# Patient Record
Sex: MALE | Race: WHITE | URBAN - METROPOLITAN AREA
[De-identification: names, ages, dates, MRNs, and addresses within clinical notes are randomized per-mention and may not be internally consistent; named-entity substitution may affect disease eponyms.]

---

## 2017-03-26 ENCOUNTER — COMMUNICATION - HEALTHEAST (OUTPATIENT)
Dept: FAMILY MEDICINE | Facility: CLINIC | Age: 77
End: 2017-03-26

## 2017-05-22 ENCOUNTER — RECORDS - HEALTHEAST (OUTPATIENT)
Dept: ADMINISTRATIVE | Facility: OTHER | Age: 77
End: 2017-05-22

## 2017-11-10 ENCOUNTER — AMBULATORY - HEALTHEAST (OUTPATIENT)
Dept: CARDIOLOGY | Facility: CLINIC | Age: 77
End: 2017-11-10

## 2017-11-10 ENCOUNTER — RECORDS - HEALTHEAST (OUTPATIENT)
Dept: ADMINISTRATIVE | Facility: OTHER | Age: 77
End: 2017-11-10

## 2018-01-15 ENCOUNTER — AMBULATORY - HEALTHEAST (OUTPATIENT)
Dept: CARDIOLOGY | Facility: CLINIC | Age: 78
End: 2018-01-15

## 2018-01-15 ENCOUNTER — HOME CARE/HOSPICE - HEALTHEAST (OUTPATIENT)
Dept: HOME HEALTH SERVICES | Facility: HOME HEALTH | Age: 78
End: 2018-01-15

## 2018-01-15 ENCOUNTER — RECORDS - HEALTHEAST (OUTPATIENT)
Dept: ADMINISTRATIVE | Facility: OTHER | Age: 78
End: 2018-01-15

## 2018-01-18 ENCOUNTER — AMBULATORY - HEALTHEAST (OUTPATIENT)
Dept: CARDIOLOGY | Facility: CLINIC | Age: 78
End: 2018-01-18

## 2018-01-18 ENCOUNTER — HOME CARE/HOSPICE - HEALTHEAST (OUTPATIENT)
Dept: HOME HEALTH SERVICES | Facility: HOME HEALTH | Age: 78
End: 2018-01-18

## 2018-01-18 DIAGNOSIS — Z95.0 PACEMAKER: ICD-10-CM

## 2018-01-18 DIAGNOSIS — I48.20 CHRONIC ATRIAL FIBRILLATION (H): ICD-10-CM

## 2018-01-18 ASSESSMENT — MIFFLIN-ST. JEOR: SCORE: 1388.46

## 2018-01-19 ENCOUNTER — RECORDS - HEALTHEAST (OUTPATIENT)
Dept: LAB | Facility: CLINIC | Age: 78
End: 2018-01-19

## 2018-01-19 ENCOUNTER — RECORDS - HEALTHEAST (OUTPATIENT)
Dept: ADMINISTRATIVE | Facility: OTHER | Age: 78
End: 2018-01-19

## 2018-01-19 LAB
ALBUMIN SERPL-MCNC: 2.3 G/DL (ref 3.5–5)
ALP SERPL-CCNC: 96 U/L (ref 45–120)
ALT SERPL W P-5'-P-CCNC: 32 U/L (ref 0–45)
ANION GAP SERPL CALCULATED.3IONS-SCNC: 10 MMOL/L (ref 5–18)
AST SERPL W P-5'-P-CCNC: 32 U/L (ref 0–40)
BILIRUB SERPL-MCNC: 0.4 MG/DL (ref 0–1)
BUN SERPL-MCNC: 47 MG/DL (ref 8–28)
CALCIUM SERPL-MCNC: 7.9 MG/DL (ref 8.5–10.5)
CHLORIDE BLD-SCNC: 98 MMOL/L (ref 98–107)
CO2 SERPL-SCNC: 27 MMOL/L (ref 22–31)
CREAT SERPL-MCNC: 4.66 MG/DL (ref 0.7–1.3)
GFR SERPL CREATININE-BSD FRML MDRD: 12 ML/MIN/1.73M2
GLUCOSE BLD-MCNC: 123 MG/DL (ref 70–125)
MAGNESIUM SERPL-MCNC: 2.3 MG/DL (ref 1.8–2.6)
POTASSIUM BLD-SCNC: 4.6 MMOL/L (ref 3.5–5)
PROT SERPL-MCNC: 5.7 G/DL (ref 6–8)
SODIUM SERPL-SCNC: 135 MMOL/L (ref 136–145)

## 2018-01-20 LAB — HCC DEVICE COMMENTS: NORMAL

## 2018-01-23 ENCOUNTER — HOME CARE/HOSPICE - HEALTHEAST (OUTPATIENT)
Dept: HOME HEALTH SERVICES | Facility: HOME HEALTH | Age: 78
End: 2018-01-23

## 2018-01-27 ENCOUNTER — COMMUNICATION - HEALTHEAST (OUTPATIENT)
Dept: SCHEDULING | Facility: CLINIC | Age: 78
End: 2018-01-27

## 2018-01-28 ENCOUNTER — HOME CARE/HOSPICE - HEALTHEAST (OUTPATIENT)
Dept: HOME HEALTH SERVICES | Facility: HOME HEALTH | Age: 78
End: 2018-01-28

## 2018-01-30 ENCOUNTER — HOME CARE/HOSPICE - HEALTHEAST (OUTPATIENT)
Dept: HOME HEALTH SERVICES | Facility: HOME HEALTH | Age: 78
End: 2018-01-30

## 2018-01-30 ENCOUNTER — RECORDS - HEALTHEAST (OUTPATIENT)
Dept: ADMINISTRATIVE | Facility: OTHER | Age: 78
End: 2018-01-30

## 2018-01-30 ENCOUNTER — RECORDS - HEALTHEAST (OUTPATIENT)
Dept: LAB | Facility: CLINIC | Age: 78
End: 2018-01-30

## 2018-01-31 ENCOUNTER — HOME CARE/HOSPICE - HEALTHEAST (OUTPATIENT)
Dept: HOME HEALTH SERVICES | Facility: HOME HEALTH | Age: 78
End: 2018-01-31

## 2018-01-31 LAB
ERYTHROCYTE [DISTWIDTH] IN BLOOD BY AUTOMATED COUNT: 15.3 % (ref 11–14.5)
HCT VFR BLD AUTO: 32.2 % (ref 40–54)
HGB BLD-MCNC: 10.5 G/DL (ref 14–18)
MAGNESIUM SERPL-MCNC: 1.9 MG/DL (ref 1.8–2.6)
MCH RBC QN AUTO: 32.2 PG (ref 27–34)
MCHC RBC AUTO-ENTMCNC: 32.6 G/DL (ref 32–36)
MCV RBC AUTO: 99 FL (ref 80–100)
PLATELET # BLD AUTO: 201 THOU/UL (ref 140–440)
PMV BLD AUTO: 10.9 FL (ref 8.5–12.5)
RBC # BLD AUTO: 3.26 MILL/UL (ref 4.4–6.2)
WBC: 7.3 THOU/UL (ref 4–11)

## 2018-02-01 ENCOUNTER — HOME CARE/HOSPICE - HEALTHEAST (OUTPATIENT)
Dept: HOME HEALTH SERVICES | Facility: HOME HEALTH | Age: 78
End: 2018-02-01

## 2018-02-02 ENCOUNTER — HOME CARE/HOSPICE - HEALTHEAST (OUTPATIENT)
Dept: HOME HEALTH SERVICES | Facility: HOME HEALTH | Age: 78
End: 2018-02-02

## 2018-02-03 ENCOUNTER — HOME CARE/HOSPICE - HEALTHEAST (OUTPATIENT)
Dept: HOME HEALTH SERVICES | Facility: HOME HEALTH | Age: 78
End: 2018-02-03

## 2018-02-05 ENCOUNTER — HOME CARE/HOSPICE - HEALTHEAST (OUTPATIENT)
Dept: HOME HEALTH SERVICES | Facility: HOME HEALTH | Age: 78
End: 2018-02-05

## 2018-02-06 ENCOUNTER — HOME CARE/HOSPICE - HEALTHEAST (OUTPATIENT)
Dept: HOME HEALTH SERVICES | Facility: HOME HEALTH | Age: 78
End: 2018-02-06

## 2018-02-07 ENCOUNTER — HOME CARE/HOSPICE - HEALTHEAST (OUTPATIENT)
Dept: HOME HEALTH SERVICES | Facility: HOME HEALTH | Age: 78
End: 2018-02-07

## 2018-02-08 ENCOUNTER — HOME CARE/HOSPICE - HEALTHEAST (OUTPATIENT)
Dept: HOME HEALTH SERVICES | Facility: HOME HEALTH | Age: 78
End: 2018-02-08

## 2018-02-09 ENCOUNTER — HOME CARE/HOSPICE - HEALTHEAST (OUTPATIENT)
Dept: HOME HEALTH SERVICES | Facility: HOME HEALTH | Age: 78
End: 2018-02-09

## 2018-02-12 ENCOUNTER — HOME CARE/HOSPICE - HEALTHEAST (OUTPATIENT)
Dept: HOME HEALTH SERVICES | Facility: HOME HEALTH | Age: 78
End: 2018-02-12

## 2018-02-13 ENCOUNTER — HOME CARE/HOSPICE - HEALTHEAST (OUTPATIENT)
Dept: HOME HEALTH SERVICES | Facility: HOME HEALTH | Age: 78
End: 2018-02-13

## 2018-02-14 ENCOUNTER — HOME CARE/HOSPICE - HEALTHEAST (OUTPATIENT)
Dept: HOME HEALTH SERVICES | Facility: HOME HEALTH | Age: 78
End: 2018-02-14

## 2018-02-19 ENCOUNTER — HOME CARE/HOSPICE - HEALTHEAST (OUTPATIENT)
Dept: HOME HEALTH SERVICES | Facility: HOME HEALTH | Age: 78
End: 2018-02-19

## 2018-03-01 ENCOUNTER — RECORDS - HEALTHEAST (OUTPATIENT)
Dept: ADMINISTRATIVE | Facility: OTHER | Age: 78
End: 2018-03-01

## 2018-04-12 ENCOUNTER — AMBULATORY - HEALTHEAST (OUTPATIENT)
Dept: CARDIOLOGY | Facility: CLINIC | Age: 78
End: 2018-04-12

## 2018-04-12 DIAGNOSIS — Z95.0 CARDIAC PACEMAKER IN SITU: ICD-10-CM

## 2018-04-12 LAB — HCC DEVICE COMMENTS: NORMAL

## 2018-04-13 ENCOUNTER — RECORDS - HEALTHEAST (OUTPATIENT)
Dept: ADMINISTRATIVE | Facility: OTHER | Age: 78
End: 2018-04-13

## 2018-04-24 ENCOUNTER — RECORDS - HEALTHEAST (OUTPATIENT)
Dept: ADMINISTRATIVE | Facility: OTHER | Age: 78
End: 2018-04-24

## 2018-05-11 ENCOUNTER — RECORDS - HEALTHEAST (OUTPATIENT)
Dept: ADMINISTRATIVE | Facility: OTHER | Age: 78
End: 2018-05-11

## 2018-06-15 ENCOUNTER — RECORDS - HEALTHEAST (OUTPATIENT)
Dept: ADMINISTRATIVE | Facility: OTHER | Age: 78
End: 2018-06-15

## 2018-07-16 ENCOUNTER — AMBULATORY - HEALTHEAST (OUTPATIENT)
Dept: CARDIOLOGY | Facility: CLINIC | Age: 78
End: 2018-07-16

## 2018-07-16 DIAGNOSIS — Z95.0 CARDIAC PACEMAKER IN SITU: ICD-10-CM

## 2018-07-16 LAB
HCC DEVICE COMMENTS: NORMAL
HCC DEVICE IMPLANTING PROVIDER: NORMAL
HCC DEVICE MANUFACTURE: NORMAL
HCC DEVICE MODEL: NORMAL
HCC DEVICE SERIAL NUMBER: NORMAL
HCC DEVICE TYPE: NORMAL

## 2018-07-27 ENCOUNTER — RECORDS - HEALTHEAST (OUTPATIENT)
Dept: ADMINISTRATIVE | Facility: OTHER | Age: 78
End: 2018-07-27

## 2018-08-03 ENCOUNTER — OFFICE VISIT (OUTPATIENT)
Dept: NEUROLOGY | Facility: CLINIC | Age: 78
End: 2018-08-03
Payer: COMMERCIAL

## 2018-08-03 VITALS
OXYGEN SATURATION: 100 % | HEART RATE: 66 BPM | DIASTOLIC BLOOD PRESSURE: 60 MMHG | SYSTOLIC BLOOD PRESSURE: 114 MMHG | WEIGHT: 137 LBS

## 2018-08-03 DIAGNOSIS — G93.40 ENCEPHALOPATHY: Primary | ICD-10-CM

## 2018-08-03 DIAGNOSIS — F68.8 PERSONALITY CHANGE: ICD-10-CM

## 2018-08-03 DIAGNOSIS — R63.4 LOSS OF WEIGHT: ICD-10-CM

## 2018-08-03 RX ORDER — WARFARIN SODIUM 2 MG/1
TABLET ORAL
COMMUNITY
Start: 2018-01-27

## 2018-08-03 RX ORDER — AMLODIPINE BESYLATE 5 MG/1
TABLET ORAL
Refills: 3 | COMMUNITY
Start: 2018-05-30

## 2018-08-03 RX ORDER — IPRATROPIUM BROMIDE AND ALBUTEROL SULFATE 2.5; .5 MG/3ML; MG/3ML
3 SOLUTION RESPIRATORY (INHALATION)
COMMUNITY
Start: 2018-01-27 | End: 2018-09-05

## 2018-08-03 ASSESSMENT — PAIN SCALES - GENERAL: PAINLEVEL: NO PAIN (0)

## 2018-08-03 NOTE — NURSING NOTE
Chief Complaint   Patient presents with     RECHECK     NEW SX. DUE TO ALZHEIMERS/DEMENTIA     Flora Salazar

## 2018-08-03 NOTE — LETTER
8/3/2018       RE: Erick Ortiz  [address] [place] [address]     Dear Colleague,    Thank you for referring your patient, Erick Ortiz, to the [facility] NEUROLOGY at [facility]. Please see a copy of my visit note below.    Service Date: 2018      Clint Jacobsen MD   [facility]    [address] [place] [address]       RE: Erick Ortiz   MRN: [id number]   : 1940      Dear Dr. Jacobsen:      Thank you for referring Erick Ortiz for neurologic consultation on 2018.  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      The patient is known to me from consultation a number of years ago.  I saw him for postherpetic neuralgia.  He has not seen me now for some time.  He did have a recollection of seeing me in the past and I do have his records available to me.  He now comes according to his wife with personality change over the last 1-1/2 to 2 years.  He has developed a pseudobulbar state.  He tends to cry when he is happy over the last 6 months.  He is extremely talkative.  He does not seem to note some personal boundaries in terms of speaking with people and he has at times become irritable.  He probably has suffered from memory loss for some time.  He has not suffered any head injury or significant concussions.  There is nothing to suggest seizures.  He has not had stroke-like symptoms.  There is no new focal weakness, paresthesias or dysequilibrium.  He has not been incontinent of stool and is on dialysis.  He has had some occasional myoclonus, according to his wife, but that actually has improved in the recent years.  He has had this for at least 6 years associated with his renal failure.  He does not seem to have any unusual dreams or actual hallucinations.  He does suffer from chronic insomnia.        CURRENT  MEDIATIONS:   The patient is taking number of medications includin.  Amitriptyline 25 mg at bedtime.     2.  Catapres.     3.  Lasix.   4.  Gabapentin 100 mg t.i.d.   5.  Lisinopril.     6.  Metoprolol.     7.  Pantoprazole.     8.  Pravastatin.     9.  Warfarin.     10.  Potassium chloride.     11.  Aspirin.     12.  Vitamin D.   13.  p.r.n. nitroglycerin.     14.  Zantac.   15.  p.r.n. hydroxyzine.        The patient has had past history of coronary artery disease and has had a coronary stent placement.  He has had a pacemaker placed.  The patient does suffer from chronic hypertension.  He has chronic obstructive lung disease but continues to smoke and knows it is injurious to his health.  He is in chronic atrial fibrillation.  The patient does receive peroneal dialysis.  He has had a prior history of prostate cancer treated by cryotherapy 5 years ago with no recurrence.  I saw him for postherpetic neuralgia and weakness in the past but that basically has resolved involving the right leg.  He does continue to drink 1 Manhattan a day.  The patient has no family history to suggest significant memory problems.  The patient had been taking gabapentin in the past, but now is actually off the drug, although it is listed on his medication list.  He has not been on it according to his wife for at least 9-10 years.  He may also be off Catapres.  The patient has had a history of prior mechanical back pain.  I last saw him in 2013.      ALLERGIES:  He has allergies listed to fentanyl, penicillin and midazolam.      The patient has had some issues with short-term memory.  He reviewed his family history and his father  of myocardial infarction at 44 and his mother basically of old age at 88.  The patient's mother  at 88 of old age, but also had dementia.  The patient's mother also had had a respiratory arrest.        The patient has lost weight now and 10 years ago weighed 180 pounds and now weighs 137 pounds.   He has suffered from orthostatic hypotension in the past but that seems to have improved.      Neurologic examination revealed a pleasant, talkative man.  He knew it was early August and knew the day of the week, but did not know the exact date.  He knew it was 2018.  He knew he was at the [facility].  He could recall 2/3 objects after 5 minutes.  He was able to draw a clock correctly to state 4:30.  The patient could tell me the states that touch [place].  He could tell me some events concerning the [employer] presidency.  Otherwise, gait, station, cerebellar testing except for difficulty with tandem with some swaying on Romberg, muscle stretch reflexes which were absent, plantar stimulation with bilateral withdrawal to plantar stimulation, strength testing except residual weakness of the right anterior tibialis muscle and toe extensors, cranial nerve examination, superficial and cortical sensory testing are unremarkable except for decreased proprioception involving the right toe muscles.  The patient did have also decreased sensation in the dorsum of his right foot to pinprick and light touch.  He had a very mild tremor.  He does have a scleral hemorrhage.  He did not have any palmomental reflexes or other frontal lobe release signs.  I could not auscultate cervical bruits.  The patient had a regular cardiac rhythm without gallops or murmurs.      I was able to review with him some limited records from [facility].  The patient on 01/21/2018 had a CT scan in the hospital after he was evaluated for delirium.  He was in for some non-neurologic problem and his wife noted he had had delirium in the past for hospitalizations.  He had diffuse cerebral atrophy noted.        IMPRESSION:  Personality change associated with pseudobulbar state as well as irritability and probable mild memory loss.      The patient does appear to be different than from the last time I saw him 5 years ago.  He has had  evaluations in the past for delirium.  He could not be completely sure, nor could his wife about all of his medications and I have asked that they bring them with on his next visit.  He is going to need appropriate blood work done.  Much of the blood work has been done through his nephrologist, Dr. Flor.  I have asked that he have an EEG done to help rule out nonconvulsive seizures. OT eval will be done for safety.  He will have appropriate blood work depending on review of blood tests.  I did talk about the possibility of further evaluation and treatment based on review of these new tests.      Thank you for allowing me to see this patient.       Sincerely yours,       Harlan Calvert MD      I spent 50 minutes with the patient and his wife today.  Over 50% of the time this involved counseling and coordination of care.  A complete review of medical systems was done and a positive review is listed in the report above.      D: 2018   T: 2018   MT: AKA      Name:     KEISHA DUGAN   MRN:      [id number]        Account:      [id number]   :      1940           Service Date: 2018      Document: [id number]

## 2018-08-03 NOTE — MR AVS SNAPSHOT
After Visit Summary   8/3/2018    Erick Ortiz    MRN: [id number]           Patient Information     Date Of Birth          1940        Visit Information        Provider Department      8/3/2018 3:30 PM Harlan Calvert MD [facility] Neurology        Today's Diagnoses     Encephalopathy    -  1    Personality change        Loss of weight           Follow-ups after your visit        Your next 10 appointments already scheduled     Aug 22, 2018 12:00 PM CDT   (Arrive by 11:45 AM)   In Lab Video Visit with  EEG TECH 2   EEG [facility] OUT[facility] ([facility])    [address] [place] [id number] [phone]            Aug 31, 2018  3:30 PM CDT   (Arrive by 3:15 PM)   Return Visit with Harlan Calvert MD   [facility] Neurology ([facility])    [address] [place] [address] [phone]              Future tests that were ordered for you today     Open Future Orders        Priority Expected Expires Ordered    T4 free Routine 8/3/2018 8/3/2019 8/3/2018    T3 total Routine 8/3/2018 8/3/2019 8/3/2018    TSH Routine 8/3/2018 8/3/2019 8/3/2018    Vitamin B12 Routine 8/3/2018 8/3/2019 8/3/2018    Methylmalonic acid Routine 8/3/2018 8/3/2019 8/3/2018    Ammonia Routine  8/3/2019 8/3/2018    Hepatic panel Routine 8/3/2018 8/3/2019 8/3/2018    EEG Routine  8/3/2019 8/3/2018            Who to contact     Please call your clinic at [phone] to:    Ask questions about your health    Make or cancel appointments    Discuss your medicines    Learn about your test results    Speak to your doctor            Additional Information About Your Visit        Bflyhart Information     Framed Data is an electronic gateway that provides easy, online access to your medical records. With Framed Data, you can request a clinic appointment, read your test results, renew a prescription or communicate with your care team.     To  sign up for [employer] visit the website at [email or web address]   You will be asked to enter the access code listed below, as well as some personal information. Please follow the directions to create your username and password.     Your access code is: [email or web address]  Expires: 10/29/2018  6:30 AM     Your access code will  in 90 days. If you need help or a new code, please contact your [facility] or call [phone] for assistance.        Care EveryWhere ID     This is your Care EveryWhere ID. This could be used by other organizations to access your [facility] medical records  [id number]        Your Vitals Were     Pulse Pulse Oximetry                66 100%           Blood Pressure from Last 3 Encounters:   18 114/60   13 138/78    Weight from Last 3 Encounters:   18 62.1 kg (137 lb)              We Performed the Following     OCCUPATIONAL THERAPY EVALUATION        Primary Care Provider Office Phone # Fax #    Sandro Mcdaniel [phone]       [facility] [address] [address]        Equal Access to Services     [facility] CenterJAME AH: Hadii aad ku hadasho Soomaali, waaxda luqadaha, qaybta kaalmada adeegyada, waxay mingin haygaben val garcia . So [facility] [phone].    ATENCIÓN: Si habla español, tiene a simon disposición servicios gratuitos de asistencia lingüística. Llame al [phone].    We comply with applicable federal civil rights laws and [place] laws. We do not discriminate on the basis of race, color, national origin, age, disability, sex, sexual orientation, or gender identity.            Thank you!     Thank you for choosing [facility] NEUROLOGY  for your care. Our goal is always to provide you with excellent care. Hearing back from our patients is one way we can continue to improve our services. Please take a few minutes to complete the written survey that you may receive in the mail after your  visit with us. Thank you!             Your Updated Medication List - Protect others around you: Learn how to safely use, store and throw away your medicines at www.disposemymeds.org.          This list is accurate as of 8/3/18  5:05 PM.  Always use your most recent med list.                   Brand Name Dispense Instructions for use Diagnosis    amitriptyline 25 MG tablet    ELAVIL     Take 1 tablet by mouth At Bedtime        amLODIPine 5 MG tablet    NORVASC      Encephalopathy, Personality change, Loss of weight       aspirin 81 MG tablet      Take 1 tablet by mouth daily        CALCIUM + D PO      Take 1 tablet by mouth daily        CENTRUM SILVER ADULT 50+ PO      Take 1 tablet by mouth daily        * cloNIDine 0.1 MG tablet    CATAPRES     Take 0.1 mg by mouth 2 times daily        * cloNIDine 0.2 MG tablet    CATAPRES     Take 1 tablet by mouth 2 times daily        Co Q 10 100 MG Caps      Take 1 capsule by mouth daily        * gabapentin 100 MG capsule    NEURONTIN     Take 2 capsules by mouth 2 times daily        * gabapentin 100 MG capsule    NEURONTIN    360 capsule    Use 200mg bid    Herpes zoster, Idiopathic progressive polyneuropathy       ipratropium - albuterol 0.5 mg/2.5 mg/3 mL 0.5-2.5 (3) MG/3ML neb solution    DUONEB     3 mLs    Encephalopathy, Personality change, Loss of weight       IRON PO      Take 1 tablet by mouth    Encephalopathy, Personality change, Loss of weight       LASIX 80 MG tablet   Generic drug:  furosemide      Take 1 tablet by mouth daily        lisinopril 20 MG tablet    PRINIVIL/ZESTRIL     2 tab PO every morning, 1 tab PO every evening        metoprolol succinate 50 MG 24 hr tablet    TOPROL-XL     Take 1 tablet by mouth daily        nitroGLYcerin 0.4 MG sublingual tablet    NITROSTAT     Place 1 tablet under the tongue every 5 minutes as needed        potassium chloride SA 20 MEQ CR tablet    K-DUR/KLOR-CON M     Take 1 tablet by mouth 2 times daily        pravastatin 80  MG tablet    PRAVACHOL     Take 1 tablet by mouth daily        ranitidine 150 MG tablet    ZANTAC     Take 1 tablet by mouth as needed        vitamin D3 37518 UNITS capsule    CHOLECALCIFEROL     Take 1 capsule by mouth every 7 days        * WARFARIN SODIUM PO      Take as directed per INR clinic        * warfarin 2 MG tablet    COUMADIN     Take 4 mg on 1/28, redose on 1/29 based on INR per primary MDAdjust dose based on INR results as directed.    Encephalopathy, Personality change, Loss of weight       * Notice:  This list has 6 medication(s) that are the same as other medications prescribed for you. Read the directions carefully, and ask your doctor or other care provider to review them with you.

## 2018-08-06 DIAGNOSIS — R63.4 LOSS OF WEIGHT: ICD-10-CM

## 2018-08-06 DIAGNOSIS — G93.40 ENCEPHALOPATHY: ICD-10-CM

## 2018-08-06 DIAGNOSIS — F68.8 PERSONALITY CHANGE: ICD-10-CM

## 2018-08-06 LAB
ALBUMIN SERPL-MCNC: 2.9 G/DL (ref 3.4–5)
ALP SERPL-CCNC: 77 U/L (ref 40–150)
ALT SERPL W P-5'-P-CCNC: 19 U/L (ref 0–70)
AMMONIA PLAS-SCNC: 20 UMOL/L (ref 10–50)
AST SERPL W P-5'-P-CCNC: 16 U/L (ref 0–45)
BILIRUB DIRECT SERPL-MCNC: 0.1 MG/DL (ref 0–0.2)
BILIRUB SERPL-MCNC: 0.3 MG/DL (ref 0.2–1.3)
PROT SERPL-MCNC: 6.2 G/DL (ref 6.8–8.8)
T3 SERPL-MCNC: 56 NG/DL (ref 60–181)
T4 FREE SERPL-MCNC: 0.69 NG/DL (ref 0.76–1.46)
TSH SERPL DL<=0.005 MIU/L-ACNC: 1.06 MU/L (ref 0.4–4)
VIT B12 SERPL-MCNC: 540 PG/ML (ref 193–986)

## 2018-08-09 LAB — METHYLMALONATE SERPL-SCNC: 0.98 UMOL/L (ref 0–0.4)

## 2018-08-21 NOTE — PROGRESS NOTES
Service Date: 2018      Clint Jacobsen MD   [facility]    [address] [place] [address]       RE: Erick Ortiz   MRN: [id number]   : 1940      Dear Dr. Jacobsen:      Thank you for referring Erick Ortiz for neurologic consultation on 2018.  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      The patient is known to me from consultation a number of years ago.  I saw him for postherpetic neuralgia.  He has not seen me now for some time.  He did have a recollection of seeing me in the past and I do have his records available to me.  He now comes according to his wife with personality change over the last 1-1/2 to 2 years.  He has developed a pseudobulbar state.  He tends to cry when he is happy over the last 6 months.  He is extremely talkative.  He does not seem to note some personal boundaries in terms of speaking with people and he has at times become irritable.  He probably has suffered from memory loss for some time.  He has not suffered any head injury or significant concussions.  There is nothing to suggest seizures.  He has not had stroke-like symptoms.  There is no new focal weakness, paresthesias or dysequilibrium.  He has not been incontinent of stool and is on dialysis.  He has had some occasional myoclonus, according to his wife, but that actually has improved in the recent years.  He has had this for at least 6 years associated with his renal failure.  He does not seem to have any unusual dreams or actual hallucinations.  He does suffer from chronic insomnia.        CURRENT MEDIATIONS:   The patient is taking number of medications includin.  Amitriptyline 25 mg at bedtime.     2.  Catapres.     3.  Lasix.   4.  Gabapentin 100 mg t.i.d.   5.  Lisinopril.     6.  Metoprolol.     7.  Pantoprazole.     8.  Pravastatin.     9.  Warfarin.      10.  Potassium chloride.     11.  Aspirin.     12.  Vitamin D.   13.  p.r.n. nitroglycerin.     14.  Zantac.   15.  p.r.n. hydroxyzine.        The patient has had past history of coronary artery disease and has had a coronary stent placement.  He has had a pacemaker placed.  The patient does suffer from chronic hypertension.  He has chronic obstructive lung disease but continues to smoke and knows it is injurious to his health.  He is in chronic atrial fibrillation.  The patient does receive peroneal dialysis.  He has had a prior history of prostate cancer treated by cryotherapy 5 years ago with no recurrence.  I saw him for postherpetic neuralgia and weakness in the past but that basically has resolved involving the right leg.  He does continue to drink 1 Manhattan a day.  The patient has no family history to suggest significant memory problems.  The patient had been taking gabapentin in the past, but now is actually off the drug, although it is listed on his medication list.  He has not been on it according to his wife for at least 9-10 years.  He may also be off Catapres.  The patient has had a history of prior mechanical back pain.  I last saw him in 2013.      ALLERGIES:  He has allergies listed to fentanyl, penicillin and midazolam.      The patient has had some issues with short-term memory.  He reviewed his family history and his father  of myocardial infarction at 44 and his mother basically of old age at 88.  The patient's mother  at 88 of old age, but also had dementia.  The patient's mother also had had a respiratory arrest.        The patient has lost weight now and 10 years ago weighed 180 pounds and now weighs 137 pounds.  He has suffered from orthostatic hypotension in the past but that seems to have improved.      Neurologic examination revealed a pleasant, talkative man.  He knew it was early August and knew the day of the week, but did not know the exact date.  He knew it was .   He knew he was at the [facility].  He could recall 2/3 objects after 5 minutes.  He was able to draw a clock correctly to state 4:30.  The patient could tell me the states that touch [place].  He could tell me some events concerning the [employer] presidency.  Otherwise, gait, station, cerebellar testing except for difficulty with tandem with some swaying on Romberg, muscle stretch reflexes which were absent, plantar stimulation with bilateral withdrawal to plantar stimulation, strength testing except residual weakness of the right anterior tibialis muscle and toe extensors, cranial nerve examination, superficial and cortical sensory testing are unremarkable except for decreased proprioception involving the right toe muscles.  The patient did have also decreased sensation in the dorsum of his right foot to pinprick and light touch.  He had a very mild tremor.  He does have a scleral hemorrhage.  He did not have any palmomental reflexes or other frontal lobe release signs.  I could not auscultate cervical bruits.  The patient had a regular cardiac rhythm without gallops or murmurs.      I was able to review with him some limited records from [facility].  The patient on 01/21/2018 had a CT scan in the hospital after he was evaluated for delirium.  He was in for some non-neurologic problem and his wife noted he had had delirium in the past for hospitalizations.  He had diffuse cerebral atrophy noted.        IMPRESSION:  Personality change associated with pseudobulbar state as well as irritability and probable mild memory loss.      The patient does appear to be different than from the last time I saw him 5 years ago.  He has had evaluations in the past for delirium.  He could not be completely sure, nor could his wife about all of his medications and I have asked that they bring them with on his next visit.  He is going to need appropriate blood work done.  Much of the blood work has been done  through his nephrologist, Dr. Flor.  I have asked that he have an EEG done to help rule out nonconvulsive seizures. OT eval will be done for safety.  He will have appropriate blood work depending on review of blood tests.  I did talk about the possibility of further evaluation and treatment based on review of these new tests.      Thank you for allowing me to see this patient.       Sincerely yours,       Argenis Calvert MD      I spent 50 minutes with the patient and his wife today.  Over 50% of the time this involved counseling and coordination of care.  A complete review of medical systems was done and a positive review is listed in the report above.         ARGENIS CALVERT MD             D: 2018   T: 2018   MT: AKA      Name:     KEISHA DUGAN   MRN:      [id number]        Account:      [id number]   :      1940           Service Date: 2018      Document: [id number]

## 2018-08-22 ENCOUNTER — OFFICE VISIT (OUTPATIENT)
Dept: NEUROLOGY | Facility: CLINIC | Age: 78
End: 2018-08-22
Payer: COMMERCIAL

## 2018-08-22 ENCOUNTER — TELEPHONE (OUTPATIENT)
Dept: NEUROLOGY | Facility: CLINIC | Age: 78
End: 2018-08-22

## 2018-08-22 DIAGNOSIS — R63.4 LOSS OF WEIGHT: ICD-10-CM

## 2018-08-22 DIAGNOSIS — R89.9 ABNORMAL LABORATORY TEST: ICD-10-CM

## 2018-08-22 DIAGNOSIS — G93.40 ENCEPHALOPATHY: Primary | ICD-10-CM

## 2018-08-22 DIAGNOSIS — F68.8 PERSONALITY CHANGE: ICD-10-CM

## 2018-08-22 DIAGNOSIS — G93.40 ENCEPHALOPATHY: ICD-10-CM

## 2018-08-22 NOTE — LETTER
2018      Erick Ortiz  [address] [place] [address]        Dear ,    We are writing to inform you of your test results.      No epilepsy-related abnormalities were recorded on this EEG.        Resulted Orders   EEG    Transcription      [facility] EEG #  (Out-Patient Video-EEG Monitoring)    Name:     Erick Ortiz   MRN: [id number]   : 1940   Procedure Date: 2018   Duration of Recordin hours, 51 minutes.      CLINICAL SUMMARY:  This diagnostic video-EEG monitoring procedure is performed in evaluation of encephalopathy in Erick Ortiz.  He was reported to be receiving gabapentin at the time of this recording.      TECHNICAL SUMMARY:  This continuous EEG monitoring procedure was performed with 23 scalp electrodes in 10-20 system placements, and additional scalp, precordial and other surface electrodes used for electrical referencing and artifact detection.  A single channel of EKG was recorded for purposes of analyzing EKG artifacts in the EEG channels.  Video monitoring was utilized and periodically reviewed by EEG technologist and the physician for electroclinical correlation.    INTERICTAL EEG ACTIVITIES:  During waking there was a 9 Hz posterior dominant rhythm.  There was frequent occurrence of intermixed generalized 4-8 Hz theta slowing symmetrically in waking.    Drowsiness was manifested by predominance of centrally maximum semirhythmic theta slowing and dropout of the posterior dominant rhythm during deeper drowsiness.  There was symmetric bilateral driving in response to photic stimulation.  Hyperventilation was not performed.   No interictal epileptiform abnormalities were recorded.    ICTAL RECORDINGS:  No electrographic seizures and no paroxysmal behavioral events occurred during this procedure.      SUMMARY OF VIDEO-EEG MONITORING:         The interictal EEG recording in waking and drowsiness was abnormal due to generalized theta slowing during waking, with  occasional brief runs of left frontotemporal delta slowing in waking and drowsiness.  No interictal epileptiform abnormalities, no electrographic seizures, and no paroxysmal behavioral events were recorded during the period of monitoring.         These findings indicate mild electrographic encephalopathy, with additional left frontotemporal neuronal dysfunction, which are etiologically nonspecific findings.  Clinical correlation is recommended.   Joo Worley M.D., Professor of Neurology        D: 2018   T: 2018   MT: CHRISTINA      Name:     KEISHA DUGAN   MRN:      [id number]        Account:        [id number]   :      1940           Procedure Date: 2018      Document: [id number]                 If you have any questions or concerns, please call the clinic at the number listed above.       Sincerely,        EEG Tech

## 2018-08-22 NOTE — MR AVS SNAPSHOT
After Visit Summary   8/22/2018    Erick Ortiz    MRN: [id number]           Patient Information     Date Of Birth          1940        Visit Information        Provider Department      8/22/2018 12:00 PM [facility] EEG TECH 2 EEG [facility] OUTPATIENT        Today's Diagnoses     Encephalopathy        Personality change        Loss of weight           Follow-ups after your visit        Your next 10 appointments already scheduled     Aug 31, 2018  3:30 PM CDT   (Arrive by 3:15 PM)   Return Visit with Harlan Calvert MD   [facility] Neurology ([facility])    [address] [place] [address]   [phone]            Sep 05, 2018 10:00 AM CDT   (Arrive by 9:45 AM)   NEW ENDOCRINE with Kristina Blood MD   [facility] Endocrinology ([facility])    [address] [place] [id number] [phone]            Sep 26, 2018 11:00 AM CDT   Evaluation with Sydni Reynaga, OT   [facility], [place], Occupational Therapy - Outpatient ([facility])    [address], [address]  [place] [address]   [phone]              Who to contact     Please call your clinic at [phone] to:    Ask questions about your health    Make or cancel appointments    Discuss your medicines    Learn about your test results    Speak to your doctor            Additional Information About Your Visit        MyChart Information     fuseSPORTt is an electronic gateway that provides easy, online access to your medical records. With Luminal, you can request a clinic appointment, read your test results, renew a prescription or communicate with your care team.     To sign up for fuseSPORTt visit the website at [email or web address]   You will be asked to enter the access code listed below, as well as some personal information. Please follow the directions to create your username and  password.     Your access code is: [email or web address]  Expires: 10/29/2018  6:30 AM     Your access code will  in 90 days. If you need help or a new code, please contact your [facility] or call [phone] for assistance.        Care EveryWhere ID     This is your Care EveryWhere ID. This could be used by other organizations to access your [place] medical records  [id number]         Blood Pressure from Last 3 Encounters:   No data found for BP    Weight from Last 3 Encounters:   No data found for Wt              Today, you had the following     No orders found for display       Primary Care Provider Office Phone # Fax #    Sandro Mcdaniel [phone]       [place] [address] [facility] [address]        Equal Access to Services     JONNIE WRIGHT : Hadii aad eric hadasho Soomaali, waaxda luqadaha, qaybta kaalmada adeegyada, waxavelina garcia . So [facility] [phone].    ATENCIÓN: Si habla español, tiene a simon disposición servicios gratuitos de asistencia lingüística. Kylah al [phone].    We comply with applicable federal civil rights laws and [place] laws. We do not discriminate on the basis of race, color, national origin, age, disability, sex, sexual orientation, or gender identity.            Thank you!     Thank you for choosing [facility]  for your care. Our goal is always to provide you with excellent care. Hearing back from our patients is one way we can continue to improve our services. Please take a few minutes to complete the written survey that you may receive in the mail after your visit with us. Thank you!             Your Updated Medication List - Protect others around you: Learn how to safely use, store and throw away your medicines at www.disposemymeds.org.          This list is accurate as of 18 11:59 PM.  Always use your most recent med list.                   Brand Name Dispense  Instructions for use Diagnosis    amitriptyline 25 MG tablet    ELAVIL     Take 1 tablet by mouth At Bedtime        amLODIPine 5 MG tablet    NORVASC      Encephalopathy, Personality change, Loss of weight       aspirin 81 MG tablet      Take 1 tablet by mouth daily        CALCIUM + D PO      Take 1 tablet by mouth daily        CENTRUM SILVER ADULT 50+ PO      Take 1 tablet by mouth daily        * cloNIDine 0.1 MG tablet    CATAPRES     Take 0.1 mg by mouth 2 times daily        * cloNIDine 0.2 MG tablet    CATAPRES     Take 1 tablet by mouth 2 times daily        Co Q 10 100 MG Caps      Take 1 capsule by mouth daily        * gabapentin 100 MG capsule    NEURONTIN     Take 2 capsules by mouth 2 times daily        * gabapentin 100 MG capsule    NEURONTIN    360 capsule    Use 200mg bid    Herpes zoster, Idiopathic progressive polyneuropathy       ipratropium - albuterol 0.5 mg/2.5 mg/3 mL 0.5-2.5 (3) MG/3ML neb solution    DUONEB     3 mLs    Encephalopathy, Personality change, Loss of weight       IRON PO      Take 1 tablet by mouth    Encephalopathy, Personality change, Loss of weight       LASIX 80 MG tablet   Generic drug:  furosemide      Take 1 tablet by mouth daily        lisinopril 20 MG tablet    PRINIVIL/ZESTRIL     2 tab PO every morning, 1 tab PO every evening        metoprolol succinate 50 MG 24 hr tablet    TOPROL-XL     Take 1 tablet by mouth daily        nitroGLYcerin 0.4 MG sublingual tablet    NITROSTAT     Place 1 tablet under the tongue every 5 minutes as needed        potassium chloride SA 20 MEQ CR tablet    K-DUR/KLOR-CON M     Take 1 tablet by mouth 2 times daily        pravastatin 80 MG tablet    PRAVACHOL     Take 1 tablet by mouth daily        ranitidine 150 MG tablet    ZANTAC     Take 1 tablet by mouth as needed        vitamin D3 28880 UNITS capsule    CHOLECALCIFEROL     Take 1 capsule by mouth every 7 days        * WARFARIN SODIUM PO      Take as directed per INR clinic        * warfarin  2 MG tablet    COUMADIN     Take 4 mg on 1/28, redose on 1/29 based on INR per primary MDAdjust dose based on INR results as directed.    Encephalopathy, Personality change, Loss of weight       * Notice:  This list has 6 medication(s) that are the same as other medications prescribed for you. Read the directions carefully, and ask your doctor or other care provider to review them with you.

## 2018-08-23 NOTE — PROCEDURES
[facility] EEG #  (Out-Patient Video-EEG Monitoring)    Name:     Erick Ortiz   MRN: [id number]   : 1940   Procedure Date: 2018   Duration of Recordin hours, 51 minutes.      CLINICAL SUMMARY:  This diagnostic video-EEG monitoring procedure is performed in evaluation of encephalopathy in Erick Ortiz.  He was reported to be receiving gabapentin at the time of this recording.      TECHNICAL SUMMARY:  This continuous EEG monitoring procedure was performed with 23 scalp electrodes in 10-20 system placements, and additional scalp, precordial and other surface electrodes used for electrical referencing and artifact detection.  A single channel of EKG was recorded for purposes of analyzing EKG artifacts in the EEG channels.  Video monitoring was utilized and periodically reviewed by EEG technologist and the physician for electroclinical correlation.    INTERICTAL EEG ACTIVITIES:  During waking there was a 9 Hz posterior dominant rhythm.  There was frequent occurrence of intermixed generalized 4-8 Hz theta slowing symmetrically in waking.    Drowsiness was manifested by predominance of centrally maximum semirhythmic theta slowing and dropout of the posterior dominant rhythm during deeper drowsiness.  There was symmetric bilateral driving in response to photic stimulation.  Hyperventilation was not performed.   No interictal epileptiform abnormalities were recorded.    ICTAL RECORDINGS:  No electrographic seizures and no paroxysmal behavioral events occurred during this procedure.      SUMMARY OF VIDEO-EEG MONITORING:         The interictal EEG recording in waking and drowsiness was abnormal due to generalized theta slowing during waking, with occasional brief runs of left frontotemporal delta slowing in waking and drowsiness.  No interictal epileptiform abnormalities, no electrographic seizures, and no paroxysmal behavioral events were recorded during the period of monitoring.         These findings  indicate mild electrographic encephalopathy, with additional left frontotemporal neuronal dysfunction, which are etiologically nonspecific findings.  Clinical correlation is recommended.   Joo Worley M.D., Professor of Neurology        D: 2018   T: 2018   MT: CHRISTINA      Name:     KEISHA DUGAN   MRN:      [id number]        Account:        [id number]   :      1940           Procedure Date: 2018      Document: [id number]

## 2018-08-24 ENCOUNTER — TELEPHONE (OUTPATIENT)
Dept: ENDOCRINOLOGY | Facility: CLINIC | Age: 78
End: 2018-08-24

## 2018-08-24 NOTE — TELEPHONE ENCOUNTER
To schedulers : please schedule with consult service (or open DANIS) within 2-3 week.    Silvia Baum MD  Endocrine triage

## 2018-08-24 NOTE — TELEPHONE ENCOUNTER
----- Message from Moon Workman sent at 8/24/2018  1:58 PM CDT -----      ----- Message -----     From: Carson Decker     Sent: 8/24/2018   1:54 PM       To: Anika Quezada RN Jill,    Can you please help pt sched a New Endo appt? Please and thank you.  ----- Message -----     From: Anika Madrigal RN     Sent: 8/22/2018  10:57 AM       To: Clinic [id number]    Please schedule a new Endo for abnormal thyroid test per Dr. Calvert.    Thank you!

## 2018-08-27 NOTE — TELEPHONE ENCOUNTER
[facility] Call [facility]    Phone Message    May a detailed message be left on voicemail: yes    Reason for Call: patient wife called stating they have not gotten any calls to scheduleand it looks like per Dr. Baum she is wanting patient to get in pretty soon, please call patient to schedule as soon as possible, Thanks in Advance!    Action Taken: Message routed to:  [facility] ([facility]): CAROLEE

## 2018-08-31 ENCOUNTER — OFFICE VISIT (OUTPATIENT)
Dept: NEUROLOGY | Facility: CLINIC | Age: 78
End: 2018-08-31
Payer: COMMERCIAL

## 2018-08-31 VITALS
SYSTOLIC BLOOD PRESSURE: 151 MMHG | BODY MASS INDEX: 20.61 KG/M2 | TEMPERATURE: 97.5 F | OXYGEN SATURATION: 99 % | HEART RATE: 60 BPM | WEIGHT: 136 LBS | HEIGHT: 68 IN | DIASTOLIC BLOOD PRESSURE: 79 MMHG

## 2018-08-31 DIAGNOSIS — B02.29 OTHER POSTHERPETIC NERVOUS SYSTEM INVOLVEMENT: Primary | ICD-10-CM

## 2018-08-31 RX ORDER — GABAPENTIN 100 MG/1
CAPSULE ORAL
Qty: 120 CAPSULE | Refills: 3 | Status: SHIPPED | OUTPATIENT
Start: 2018-08-31 | End: 2018-08-31

## 2018-08-31 RX ORDER — GABAPENTIN 100 MG/1
100 CAPSULE ORAL 3 TIMES DAILY
Qty: 90 CAPSULE | Refills: 3 | Status: SHIPPED | OUTPATIENT
Start: 2018-08-31 | End: 2018-08-31

## 2018-08-31 RX ORDER — GABAPENTIN 100 MG/1
CAPSULE ORAL
Qty: 120 CAPSULE | Refills: 3 | Status: SHIPPED | OUTPATIENT
Start: 2018-08-31 | End: 2018-09-05

## 2018-08-31 NOTE — LETTER
2018       RE: Erick Ortiz  [address] [place] [address]     Dear Colleague,    Thank you for referring your patient, Erick Ortiz, to the [facility] NEUROLOGY at [facility]. Please see a copy of my visit note below.    Service Date: 2018      Clint Jacobsen MD   [facility]    [address] [place]  [address]      RE: Erick Ortiz   MRN: [id number]   : 1940      Dear Dr. Jacobsen:      This is in regard to followup on Erick Ortiz.  The patient returned today with chief complaint of personality change and possible memory loss.      The patient did undergo testing here.  His EEG showed some mild electrographic slowing, more prominent in the left temporal area.  This was done over approximately 3 hours.  He did have a 9 Hz background with some intermixed 4-8 Hz theta activity.  There was nothing to suggest epileptiform activity.  He had normal liver functions.  His total protein was low at 6.2 and his albumin low at 2.9.  He did have an ammonia level of 20.  His B12 level was 540 picograms.  His TSH was 1.06 and he had low findings for T3 and T4.  I suspect this relates possibly to his protein abnormalities and to his nephrotic syndrome.  I did suggest though that he have formal endocrine consultation and that is pending here at [facility].  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  He never had trouble taking it and he was on a dosage of 300 mg, that I had reviewed with Dr. Flor a number of years ago when I needed to treat his postherpetic right leg pain and weakness.  The patient's dog evidently has had a condition and that animal has been placed on gabapentin.  The patient and his wife do not have any fear of this drug and it did not cause any side effects.      The patient's blood pressure today was 151/79  with a pulse of 60 by a medical assistant using a machine.  His blood pressure using a soft cuff by me was 144/62 with a pulse of 60.  He had a regular cardiac rhythm without gallops or murmurs.  He had expiratory slowing on auscultation of his lungs.      I went over from the Internet with the patient and his wife treatment of pseudobulbar state or pseudobulbar affect.  Unfortunately, there is a number of drugs that have been tried but none really have been shown to have major impact on this disease process.  Citalopram has been touted as a treatment option, but I did suggest he would have to be off his current dose of amitriptyline because of the risk of interaction with QT interval issues.  I also reviewed Seroquel and its issues.  He may be a candidate for that drug, too.  First, he is going to try going back on gabapentin up to 200 mg at bedtime to see if this helps his emotional state and his talkativeness.  He surprisingly could still recall much about our consultation over 5 years ago and the most recent one I had with him.  He was not as verbose today and he was not tangential.  The patient and his wife both agreed that he has had some depression issues this spring and that has improved with the ambience of summertime.      I am going to have followup with the patient in the next 2 months and on a p.r.n. basis.      Thank you for allowing me to see this patient.      Sincerely yours,       Harlan Calvert MD      cc:   Mario Flor MD   [facility] [address] [address]                 D: 2018   T: 2018   MT: AKA      Name:     KEISHA DUGAN   MRN:      [id number]        Account:      [id number]   :      1940           Service Date: 2018      Document: [id number]

## 2018-08-31 NOTE — NURSING NOTE
Chief Complaint   Patient presents with     RECHECK     [facility] RETURN 3WK F/U       Monisha Alvarado, EMT

## 2018-08-31 NOTE — MR AVS SNAPSHOT
After Visit Summary   8/31/2018    Erick Ortiz    MRN: [id number]           Patient Information     Date Of Birth          1940        Visit Information        Provider Department      8/31/2018 3:30 PM Harlan Calvert MD [facility] Neurology        Today's Diagnoses     Other postherpetic nervous system involvement    -  1       Follow-ups after your visit        Follow-up notes from your care team     Return in about 5 weeks (around 10/5/2018).      Your next 10 appointments already scheduled     Sep 26, 2018 11:00 AM CDT   Evaluation with Sydni Reynaga OT   [facility], [place], Occupational Therapy - Outpatient ([facility])    [address], [address]  [place] [address]   [phone]              Future tests that were ordered for you today     Open Future Orders        Priority Expected Expires Ordered    Albumin level Routine  9/5/2019 9/5/2018    TSH Routine  9/5/2019 9/5/2018    Thyroxine Free by Equilibrium Dialysis Routine  9/5/2019 9/5/2018    T4 free Routine  9/5/2019 9/5/2018    Prolactin Routine  9/5/2019 9/5/2018    Follicle stimulating hormone Routine  9/5/2019 9/5/2018    Lutropin Routine  9/5/2019 9/5/2018    Testosterone total Routine  9/5/2019 9/5/2018    Cortisol Routine  9/5/2019 9/5/2018            Who to contact     Please call your clinic at [phone] to:    Ask questions about your health    Make or cancel appointments    Discuss your medicines    Learn about your test results    Speak to your doctor            Additional Information About Your Visit        MyChart Information     [employer] is an electronic gateway that provides easy, online access to your medical records. With Quixby, you can request a clinic appointment, read your test results, renew a prescription or communicate with your care team.     To sign up for [employer] visit the website at [email or web address]   You will be asked to enter  "the access code listed below, as well as some personal information. Please follow the directions to create your username and password.     Your access code is: [email or web address]  Expires: 10/29/2018  6:30 AM     Your access code will  in 90 days. If you need help or a new code, please contact your [facility] or call [phone] for assistance.        Care EveryWhere ID     This is your Care EveryWhere ID. This could be used by other organizations to access your [place] medical records  [id number]        Your Vitals Were     Pulse Temperature Height Pulse Oximetry BMI (Body Mass Index)       60 97.5  F (36.4  C) (Oral) 1.727 m (5' 8\") 99% 20.68 kg/m2        Blood Pressure from Last 3 Encounters:   18 155/77   18 151/79   18 114/60    Weight from Last 3 Encounters:   18 65.1 kg (143 lb 8 oz)   18 61.7 kg (136 lb)   18 62.1 kg (137 lb)              Today, you had the following     No orders found for display         Today's Medication Changes          These changes are accurate as of 18 11:59 PM.  If you have any questions, ask your nurse or doctor.               These medicines have changed or have updated prescriptions.        Dose/Directions    * gabapentin 100 MG capsule   Commonly known as:  NEURONTIN   This may have changed:  Another medication with the same name was added. Make sure you understand how and when to take each.   Used for:  Herpes zoster, Idiopathic progressive polyneuropathy   Changed by:  Harlan Calvert MD        Use 200mg bid   Quantity:  360 capsule   Refills:  4       * gabapentin 100 MG capsule   Commonly known as:  NEURONTIN   This may have changed:  You were already taking a medication with the same name, and this prescription was added. Make sure you understand how and when to take each.   Used for:  Other postherpetic nervous system involvement   Changed by:  Harlan Calvert MD        100MG PO HS  " X 3 DAYS, THEN 200MG HS   Quantity:  120 capsule   Refills:  3       * Notice:  This list has 2 medication(s) that are the same as other medications prescribed for you. Read the directions carefully, and ask your doctor or other care provider to review them with you.         Where to get your medicines      These medications were sent to [facility] - [place], [place] - [address]  [address], [facility] [address]     Phone:  [phone]     gabapentin 100 MG capsule                Primary Care Provider Office Phone # Fax #    Sandro Mcdaniel [phone]       [facility] [address]  [facility] [address]        Equal Access to Services     [facility] : Hadii aad ku hadasho Sojocelinali, waaxda luqadaha, qaybta kaalmada adeegyada, earl garcia . So [facility] [phone].    ATENCIÓN: Si habla español, tiene a simon disposición servicios gratuitos de asistencia lingüística. [facility] [phone].    We comply with applicable federal civil rights laws and [place] laws. We do not discriminate on the basis of race, color, national origin, age, disability, sex, sexual orientation, or gender identity.            Thank you!     Thank you for choosing [facility] NEUROLOGY  for your care. Our goal is always to provide you with excellent care. Hearing back from our patients is one way we can continue to improve our services. Please take a few minutes to complete the written survey that you may receive in the mail after your visit with us. Thank you!             Your Updated Medication List - Protect others around you: Learn how to safely use, store and throw away your medicines at www.disposemymeds.org.          This list is accurate as of 8/31/18 11:59 PM.  Always use your most recent med list.                   Brand Name Dispense Instructions for use Diagnosis    amitriptyline 25 MG tablet    ELAVIL     Take 1 tablet by mouth At Bedtime         amLODIPine 5 MG tablet    NORVASC      Encephalopathy, Personality change, Loss of weight       aspirin 81 MG tablet      Take 1 tablet by mouth daily        CALCIUM + D PO      Take 1 tablet by mouth daily        CENTRUM SILVER ADULT 50+ PO      Take 1 tablet by mouth daily        * cloNIDine 0.1 MG tablet    CATAPRES     Take 0.1 mg by mouth 2 times daily        * cloNIDine 0.2 MG tablet    CATAPRES     Take 1 tablet by mouth 2 times daily        Co Q 10 100 MG Caps      Take 1 capsule by mouth daily        * gabapentin 100 MG capsule    NEURONTIN    360 capsule    Use 200mg bid    Herpes zoster, Idiopathic progressive polyneuropathy       * gabapentin 100 MG capsule    NEURONTIN    120 capsule    100MG PO HS  X 3 DAYS, THEN 200MG HS    Other postherpetic nervous system involvement       IRON PO      Take 1 tablet by mouth    Encephalopathy, Personality change, Loss of weight       LASIX 80 MG tablet   Generic drug:  furosemide      Take 1 tablet by mouth daily        lisinopril 20 MG tablet    PRINIVIL/ZESTRIL     2 tab PO every morning, 1 tab PO every evening        metoprolol succinate 50 MG 24 hr tablet    TOPROL-XL     Take 1 tablet by mouth daily        nitroGLYcerin 0.4 MG sublingual tablet    NITROSTAT     Place 1 tablet under the tongue every 5 minutes as needed        potassium chloride SA 20 MEQ CR tablet    K-DUR/KLOR-CON M     Take 1 tablet by mouth 2 times daily        pravastatin 80 MG tablet    PRAVACHOL     Take 1 tablet by mouth daily        ranitidine 150 MG tablet    ZANTAC     Take 1 tablet by mouth as needed        vitamin D3 04393 UNITS capsule    CHOLECALCIFEROL     Take 1 capsule by mouth every 7 days        * WARFARIN SODIUM PO      Take as directed per INR clinic        * warfarin 2 MG tablet    COUMADIN     Take 4 mg on 1/28, redose on 1/29 based on INR per primary MDAdjust dose based on INR results as directed.    Encephalopathy, Personality change, Loss of weight       *  Notice:  This list has 6 medication(s) that are the same as other medications prescribed for you. Read the directions carefully, and ask your doctor or other care provider to review them with you.

## 2018-09-05 ENCOUNTER — OFFICE VISIT (OUTPATIENT)
Dept: ENDOCRINOLOGY | Facility: CLINIC | Age: 78
End: 2018-09-05
Payer: COMMERCIAL

## 2018-09-05 VITALS
HEIGHT: 68 IN | DIASTOLIC BLOOD PRESSURE: 77 MMHG | HEART RATE: 60 BPM | SYSTOLIC BLOOD PRESSURE: 155 MMHG | WEIGHT: 143.5 LBS | BODY MASS INDEX: 21.75 KG/M2

## 2018-09-05 DIAGNOSIS — E03.9 HYPOTHYROIDISM, UNSPECIFIED TYPE: Primary | ICD-10-CM

## 2018-09-05 ASSESSMENT — PAIN SCALES - GENERAL: PAINLEVEL: NO PAIN (0)

## 2018-09-05 NOTE — PATIENT INSTRUCTIONS
Your thyroid function that was done earlier last month indicates that it could be the problem in the pituitary gland, but we do not know for sure. We would like to repeat labs again as well as check other pituitary gland hormones to make sure they are okay.   Please go to the labs to get blood drawn: TSH, Free T4, Thyroxine with equilibrium dialysis, FSH, LH, prolactin, morning cortisol, albumin at your local labs.   We will contact you after the result is back.

## 2018-09-05 NOTE — LETTER
9/5/2018     RE: Erick Ortiz  [address] [place] [address]     Dear Colleague,    Thank you for referring your patient, Erick Ortiz, to the [facility] ENDOCRINOLOGY at [facility]. Please see a copy of my visit note below.    Endocrinology Fellow note    Chief complaint:  Erick is a 78 year old male seen in consultation at the request of .      HISTORY OF PRESENT ILLNESS  Erick Ortiz is a 78 year old male with h/o CAD, ESRD on PD, afib on warfarin, COPD, pseudobulbar effect, prostate cancer who presents today for abnormal thyroid function test.     Patient was seen by , neurology for personality change and possible memory loss and thought to be pseudobulbar effect. However, the patient complaint about weight loss 25 lbs from 160 lbs to 135 lbs in 1 year, he said he lost appetite, and had trouble sleeping, so the work up was done including thyroid function test. Results came back on 8/6/2018 with TSH 1.06 (0.4-4.0), Free T4 0.69 (0.76-1.46), T3 56 (). So endocrine was consulted for abnormal thyroid function test.     Patient denies previous history of thyroid disease. He said this is the first time that he was told to have thyroid problem. He reported having good energy, very active and have to do something all the time. His weight has gained back from 135 to 143 in 2 weeks. His appetite is back as well as he forced himself to eat. Denies heat/cold intolerance. Normal BM 1-2 time/day. Stable mood. He thought he has lost muscle strength since he lost weight. Denies difficulty swallowing, neck pain, difficulty breathing, hoarseness. Denies h/o radiation exposure. Denies taking biotin or supplements besides centrum.     He uses reading glasses, otherwise denies blurry vision, double vision, headache, n/v. Denies breast tenderness, nipple discharge. He had h/o orthostatic hypotension, but it was related to blood pressure meds 2 years ago, which has  resolved now with medication adjustment and he is doing things slowly to prevent it. Denies polyuria, urinate 1-2/night and can go 3-4 hours without urination. He admitted that he has zero libido. This has been going on since 2000 after he started on blood pressure medication. No erection or morning erection. He shaves less frequent than before. No changes of axillary hair or pubic hair. Denies h/o JERSON. Denies opioid use.       REVIEW OF SYSTEMS    10 system ROS otherwise as per the HPI or negative    Past Medical History  CAD  Afib on warfarin  COPD  ESRD on PD  Pseudobulbar effect  Prostate cancer      Medications  Current Outpatient Prescriptions   Medication     amitriptyline (ELAVIL) 25 MG tablet     amLODIPine (NORVASC) 5 MG tablet     furosemide (LASIX) 80 MG tablet     gabapentin (NEURONTIN) 100 MG capsule     IRON PO     lisinopril (PRINIVIL,ZESTRIL) 20 MG tablet     metoprolol (TOPROL-XL) 50 MG 24 hr tablet     Multiple Vitamins-Minerals (CENTRUM SILVER ADULT 50+ PO)     nitroglycerin (NITROSTAT) 0.4 MG SL tablet     pravastatin (PRAVACHOL) 80 MG tablet     ranitidine (ZANTAC) 150 MG tablet     warfarin (COUMADIN) 2 MG tablet     aspirin 81 MG tablet     Calcium Carbonate-Vitamin D (CALCIUM + D PO)     cloNIDine (CATAPRES) 0.1 MG tablet     cloNIDine (CATAPRES) 0.2 MG tablet     Coenzyme Q10 (CO Q 10) 100 MG CAPS     potassium chloride SA (K-DUR,KLOR-CON M) 20 MEQ tablet     vitamin D3 (CHOLECALCIFEROL) 89758 UNITS capsule     WARFARIN SODIUM PO     [DISCONTINUED] gabapentin (NEURONTIN) 100 MG capsule     [DISCONTINUED] gabapentin (NEURONTIN) 100 MG capsule     No current facility-administered medications for this visit.        Current Outpatient Prescriptions   Medication Sig Dispense Refill     amitriptyline (ELAVIL) 25 MG tablet Take 1 tablet by mouth At Bedtime       amLODIPine (NORVASC) 5 MG tablet   3     furosemide (LASIX) 80 MG tablet Take 1 tablet by mouth daily       gabapentin (NEURONTIN) 100 MG  capsule Use 200mg bid 360 capsule 4     IRON PO Take 1 tablet by mouth       lisinopril (PRINIVIL,ZESTRIL) 20 MG tablet 2 tab PO every morning, 1 tab PO every evening       metoprolol (TOPROL-XL) 50 MG 24 hr tablet Take 1 tablet by mouth daily       Multiple Vitamins-Minerals (CENTRUM SILVER ADULT 50+ PO) Take 1 tablet by mouth daily       nitroglycerin (NITROSTAT) 0.4 MG SL tablet Place 1 tablet under the tongue every 5 minutes as needed       pravastatin (PRAVACHOL) 80 MG tablet Take 1 tablet by mouth daily       ranitidine (ZANTAC) 150 MG tablet Take 1 tablet by mouth as needed       warfarin (COUMADIN) 2 MG tablet Take 4 mg on 1/28, redose on 1/29 based on INR per primary MDAdjust dose based on INR results as directed.       aspirin 81 MG tablet Take 1 tablet by mouth daily       Calcium Carbonate-Vitamin D (CALCIUM + D PO) Take 1 tablet by mouth daily       cloNIDine (CATAPRES) 0.1 MG tablet Take 0.1 mg by mouth 2 times daily       cloNIDine (CATAPRES) 0.2 MG tablet Take 1 tablet by mouth 2 times daily       Coenzyme Q10 (CO Q 10) 100 MG CAPS Take 1 capsule by mouth daily       potassium chloride SA (K-DUR,KLOR-CON M) 20 MEQ tablet Take 1 tablet by mouth 2 times daily       vitamin D3 (CHOLECALCIFEROL) 22993 UNITS capsule Take 1 capsule by mouth every 7 days       WARFARIN SODIUM PO Take as directed per INR clinic       [DISCONTINUED] gabapentin (NEURONTIN) 100 MG capsule 100MG PO HS  X 3 DAYS, THEN 200MG  capsule 3     [DISCONTINUED] gabapentin (NEURONTIN) 100 MG capsule Take 2 capsules by mouth 2 times daily         Allergies  Allergies   Allergen Reactions     Fentanyl Shortness Of Breath     Midazolam Shortness Of Breath     Ampicillin Diarrhea     Reaction: Severe diarrhea.     Ampicillin Benzathine GI Disturbance     Hydrochlorothiazide Photosensitivity     Ibuprofen      Penicillins GI Disturbance     Ragweeds Other (See Comments)     Nasal congestion/drainage, watery itchy eyes         Family  "History  Denies family h/o thyroid disease  Mother  at age of 87 from dementia  Father  when the patient was 5 yo, he said the heart disease runs in his father side    Social History  - Smoking cigarettes 1/2 ppd for 67 years  - Alcohol: 1 drink of arturo daily  - He is living with his wife,  for 51 years.  - He has 3 biological sons age of 41, 44, 47  - Retired at age of 61. He used to be an ,     Physical Exam  /77  Pulse 60  Ht 1.727 m (5' 8\")  Wt 65.1 kg (143 lb 8 oz)  BMI 21.82 kg/m2  Body mass index is 21.82 kg/(m^2).  GENERAL :  In no apparent distress  SKIN: Normal color, normal temperature, texture.  No hirsutism, alopecia or purple striae.     EYES: PERRL, EOMI, No scleral icterus,  No proptosis, conjunctival redness, stare, retraction. No visual field defect.  MOUTH: Moist, pink; pharynx clear  NECK: No visible masses. No palpable adenopathy, or masses.   THYROID:  Normal, nontender, smooth / firm texture,  no nodules, no Bruit   RESP: Lungs clear to auscultation bilaterally  CARDIAC: Regular rate and rhythm, normal S1 S2, without murmurs  ABDOMEN: Normal bowel sounds; soft, nontender, no HSM or masses       NEURO: awake, alert, responds appropriately to questions.  Cranial nerves intact.  Moves all extremities; Gait normal.  No tremor of the outstretched hand.  DTRs  2+ both knees , motor power grade 5/5 all.   EXTREMITIES: No clubbing, cyanosis or edema.    DATA REVIEW    Results for KEISHA DUGAN (MRN [id number]) as of 2018 22:42   Ref. 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 Found to have low FT4 0.69, low T3 56 and inappropriately normal TSH 1.06. This suggested central hypothyroidism. However, the patient has lost weight 25 lbs in 1 year (which already gained some back), besides that he is clinical euthyroid. Pituitary axis assessment found to have low libido and ED, but this has been ~18 years which thought to be related to blood pressure meds, he has normal hair distribution. Also with age of 78 and co morbidities, ESRD on PD, his testosterone level could be in low-normal range from that. Other axis seems to be normal - - no galactorrhea or polyuria/polydipsia. No visual field defect on exam. He had CT brain from OSH done in 1/21/2018 which showed no acute pathology, but it is not pituitary dedicated. Due to patient is on dialysis, the thyroid function assay could be inaccurate, especially free T4. Will recheck TFT again with thyroxine equilibrium dialysis to confirm. Also will work up for pituitary involvement by checking FSH, LH, total testosterone, prolactin, morning cortisol which patient will have blood drawn at [facility] labs close to his house. Will follow up with the patient once labs result is back.     Orders Placed This Encounter   Procedures     TSH     Thyroxine Free by Equilibrium Dialysis     T4 free     Prolactin     Follicle stimulating hormone     Lutropin     Testosterone total     Cortisol     Albumin level       Patient was seen and discussed with .     Johnnie Jules MD  Endocrine fellow  [phone]    Endocrine Staff Note    The patient was seen and examined by me with .  Her note details our mutual findings and plan.    Kristina Blood MD

## 2018-09-05 NOTE — MR AVS SNAPSHOT
After Visit Summary   9/5/2018    Erick Ortiz    MRN: [id number]           Patient Information     Date Of Birth          1940        Visit Information        Provider Department      9/5/2018 10:00 AM Kristina Blood MD [facility]        Today's Diagnoses     Hypothyroidism, unspecified type    -  1      Care Instructions    Your thyroid function that was done earlier last month indicates that it could be the problem in the pituitary gland, but we do not know for sure. We would like to repeat labs again as well as check other pituitary gland hormones to make sure they are okay.   Please go to the labs to get blood drawn: TSH, Free T4, Thyroxine with equilibrium dialysis, FSH, LH, prolactin, morning cortisol, albumin at your local labs.   We will contact you after the result is back.           Follow-ups after your visit        Your next 10 appointments already scheduled     Sep 26, 2018 11:00 AM CDT   Evaluation with Sydni Reynaga, OT   [facility], [place], Occupational Therapy - Outpatient ([facility])    [address], [address]  [place] [address]   [phone]              Future tests that were ordered for you today     Open Future Orders        Priority Expected Expires Ordered    Albumin level Routine  9/5/2019 9/5/2018    TSH Routine  9/5/2019 9/5/2018    Thyroxine Free by Equilibrium Dialysis Routine  9/5/2019 9/5/2018    T4 free Routine  9/5/2019 9/5/2018    Prolactin Routine  9/5/2019 9/5/2018    Follicle stimulating hormone Routine  9/5/2019 9/5/2018    Lutropin Routine  9/5/2019 9/5/2018    Testosterone total Routine  9/5/2019 9/5/2018    Cortisol Routine  9/5/2019 9/5/2018            Who to contact     Please call your clinic at [phone] to:    Ask questions about your health    Make or cancel appointments    Discuss your medicines    Learn about your test results    Speak to your doctor             "Additional Information About Your Visit        [employer] Information     [employer] is an electronic gateway that provides easy, online access to your medical records. With [employer], you can request a clinic appointment, read your test results, renew a prescription or communicate with your care team.     To sign up for [employer] visit the website at [email or web address]   You will be asked to enter the access code listed below, as well as some personal information. Please follow the directions to create your username and password.     Your access code is: [email or web address]  Expires: 10/29/2018  6:30 AM     Your access code will  in 90 days. If you need help or a new code, please contact your [facility] or call [phone] for assistance.        Care EveryWhere ID     This is your Care EveryWhere ID. This could be used by other organizations to access your [place] medical records  [id number]        Your Vitals Were     Pulse Height BMI (Body Mass Index)             60 1.727 m (5' 8\") 21.82 kg/m2          Blood Pressure from Last 3 Encounters:   18 155/77   18 151/79   18 114/60    Weight from Last 3 Encounters:   18 65.1 kg (143 lb 8 oz)   18 61.7 kg (136 lb)   18 62.1 kg (137 lb)               Primary Care Provider Office Phone # Fax #    Sandro Mcdaniel [phone]       [place] [address] [address]        Equal Access to Services     [facility]: Hadii aad ku hadasho Soomaali, waaxda luqadaha, qaybta kaalmada adeegearl mulligan . So [facility] [phone].    ATENCIÓN: Si habla español, tiene a simon disposición servicios gratuitos de asistencia lingüística. Llame al [phone].    We comply with applicable federal civil rights laws and [place] laws. We do not discriminate on the basis of race, color, national origin, age, disability, sex, sexual orientation, " or gender identity.            Thank you!     Thank you for choosing [facility] ENDOCRINOLOG[facility]  for your care. Our goal is always to provide you with excellent care. Hearing back from our patients is one way we can continue to improve our services. Please take a few minutes to complete the written survey that you may receive in the mail after your visit with us. Thank you!             Your Updated Medication List - Protect others around you: Learn how to safely use, store and throw away your medicines at www.disposemymeds.org.          This list is accurate as of 9/5/18 11:49 AM.  Always use your most recent med list.                   Brand Name Dispense Instructions for use Diagnosis    amitriptyline 25 MG tablet    ELAVIL     Take 1 tablet by mouth At Bedtime        amLODIPine 5 MG tablet    NORVASC      Encephalopathy, Personality change, Loss of weight       aspirin 81 MG tablet      Take 1 tablet by mouth daily        CALCIUM + D PO      Take 1 tablet by mouth daily        CENTRUM SILVER ADULT 50+ PO      Take 1 tablet by mouth daily        * cloNIDine 0.1 MG tablet    CATAPRES     Take 0.1 mg by mouth 2 times daily        * cloNIDine 0.2 MG tablet    CATAPRES     Take 1 tablet by mouth 2 times daily        Co Q 10 100 MG Caps      Take 1 capsule by mouth daily        gabapentin 100 MG capsule    NEURONTIN    360 capsule    Use 200mg bid    Herpes zoster, Idiopathic progressive polyneuropathy       IRON PO      Take 1 tablet by mouth    Encephalopathy, Personality change, Loss of weight       LASIX 80 MG tablet   Generic drug:  furosemide      Take 1 tablet by mouth daily        lisinopril 20 MG tablet    PRINIVIL/ZESTRIL     2 tab PO every morning, 1 tab PO every evening        metoprolol succinate 50 MG 24 hr tablet    TOPROL-XL     Take 1 tablet by mouth daily        nitroGLYcerin 0.4 MG sublingual tablet    NITROSTAT     Place 1 tablet under the tongue every 5 minutes as needed        potassium chloride SA 20  MEQ CR tablet    K-DUR/KLOR-CON M     Take 1 tablet by mouth 2 times daily        pravastatin 80 MG tablet    PRAVACHOL     Take 1 tablet by mouth daily        ranitidine 150 MG tablet    ZANTAC     Take 1 tablet by mouth as needed        vitamin D3 22327 UNITS capsule    CHOLECALCIFEROL     Take 1 capsule by mouth every 7 days        * WARFARIN SODIUM PO      Take as directed per INR clinic        * warfarin 2 MG tablet    COUMADIN     Take 4 mg on 1/28, redose on 1/29 based on INR per primary MDAdjust dose based on INR results as directed.    Encephalopathy, Personality change, Loss of weight       * Notice:  This list has 4 medication(s) that are the same as other medications prescribed for you. Read the directions carefully, and ask your doctor or other care provider to review them with you.

## 2018-09-05 NOTE — PROGRESS NOTES
Endocrinology Fellow note    Chief complaint:  Erick is a 78 year old male seen in consultation at the request of .      HISTORY OF PRESENT ILLNESS  Erick Ortiz is a 78 year old male with h/o CAD, ESRD on PD, afib on warfarin, COPD, pseudobulbar effect, prostate cancer who presents today for abnormal thyroid function test.     Patient was seen by , neurology for personality change and possible memory loss and thought to be pseudobulbar effect. However, the patient complaint about weight loss 25 lbs from 160 lbs to 135 lbs in 1 year, he said he lost appetite, and had trouble sleeping, so the work up was done including thyroid function test. Results came back on 8/6/2018 with TSH 1.06 (0.4-4.0), Free T4 0.69 (0.76-1.46), T3 56 (). So endocrine was consulted for abnormal thyroid function test.     Patient denies previous history of thyroid disease. He said this is the first time that he was told to have thyroid problem. He reported having good energy, very active and have to do something all the time. His weight has gained back from 135 to 143 in 2 weeks. His appetite is back as well as he forced himself to eat. Denies heat/cold intolerance. Normal BM 1-2 time/day. Stable mood. He thought he has lost muscle strength since he lost weight. Denies difficulty swallowing, neck pain, difficulty breathing, hoarseness. Denies h/o radiation exposure. Denies taking biotin or supplements besides centrum.     He uses reading glasses, otherwise denies blurry vision, double vision, headache, n/v. Denies breast tenderness, nipple discharge. He had h/o orthostatic hypotension, but it was related to blood pressure meds 2 years ago, which has resolved now with medication adjustment and he is doing things slowly to prevent it. Denies polyuria, urinate 1-2/night and can go 3-4 hours without urination. He admitted that he has zero libido. This has been going on since 2000 after he started on blood pressure  medication. No erection or morning erection. He shaves less frequent than before. No changes of axillary hair or pubic hair. Denies h/o JERSON. Denies opioid use.       REVIEW OF SYSTEMS    10 system ROS otherwise as per the HPI or negative    Past Medical History  CAD  Afib on warfarin  COPD  ESRD on PD  Pseudobulbar effect  Prostate cancer      Medications  Current Outpatient Prescriptions   Medication     amitriptyline (ELAVIL) 25 MG tablet     amLODIPine (NORVASC) 5 MG tablet     furosemide (LASIX) 80 MG tablet     gabapentin (NEURONTIN) 100 MG capsule     IRON PO     lisinopril (PRINIVIL,ZESTRIL) 20 MG tablet     metoprolol (TOPROL-XL) 50 MG 24 hr tablet     Multiple Vitamins-Minerals (CENTRUM SILVER ADULT 50+ PO)     nitroglycerin (NITROSTAT) 0.4 MG SL tablet     pravastatin (PRAVACHOL) 80 MG tablet     ranitidine (ZANTAC) 150 MG tablet     warfarin (COUMADIN) 2 MG tablet     aspirin 81 MG tablet     Calcium Carbonate-Vitamin D (CALCIUM + D PO)     cloNIDine (CATAPRES) 0.1 MG tablet     cloNIDine (CATAPRES) 0.2 MG tablet     Coenzyme Q10 (CO Q 10) 100 MG CAPS     potassium chloride SA (K-DUR,KLOR-CON M) 20 MEQ tablet     vitamin D3 (CHOLECALCIFEROL) 61444 UNITS capsule     WARFARIN SODIUM PO     [DISCONTINUED] gabapentin (NEURONTIN) 100 MG capsule     [DISCONTINUED] gabapentin (NEURONTIN) 100 MG capsule     No current facility-administered medications for this visit.        Current Outpatient Prescriptions   Medication Sig Dispense Refill     amitriptyline (ELAVIL) 25 MG tablet Take 1 tablet by mouth At Bedtime       amLODIPine (NORVASC) 5 MG tablet   3     furosemide (LASIX) 80 MG tablet Take 1 tablet by mouth daily       gabapentin (NEURONTIN) 100 MG capsule Use 200mg bid 360 capsule 4     IRON PO Take 1 tablet by mouth       lisinopril (PRINIVIL,ZESTRIL) 20 MG tablet 2 tab PO every morning, 1 tab PO every evening       metoprolol (TOPROL-XL) 50 MG 24 hr tablet Take 1 tablet by mouth daily       Multiple  Vitamins-Minerals (CENTRUM SILVER ADULT 50+ PO) Take 1 tablet by mouth daily       nitroglycerin (NITROSTAT) 0.4 MG SL tablet Place 1 tablet under the tongue every 5 minutes as needed       pravastatin (PRAVACHOL) 80 MG tablet Take 1 tablet by mouth daily       ranitidine (ZANTAC) 150 MG tablet Take 1 tablet by mouth as needed       warfarin (COUMADIN) 2 MG tablet Take 4 mg on , redose on  based on INR per primary MDAdjust dose based on INR results as directed.       aspirin 81 MG tablet Take 1 tablet by mouth daily       Calcium Carbonate-Vitamin D (CALCIUM + D PO) Take 1 tablet by mouth daily       cloNIDine (CATAPRES) 0.1 MG tablet Take 0.1 mg by mouth 2 times daily       cloNIDine (CATAPRES) 0.2 MG tablet Take 1 tablet by mouth 2 times daily       Coenzyme Q10 (CO Q 10) 100 MG CAPS Take 1 capsule by mouth daily       potassium chloride SA (K-DUR,KLOR-CON M) 20 MEQ tablet Take 1 tablet by mouth 2 times daily       vitamin D3 (CHOLECALCIFEROL) 60522 UNITS capsule Take 1 capsule by mouth every 7 days       WARFARIN SODIUM PO Take as directed per INR clinic       [DISCONTINUED] gabapentin (NEURONTIN) 100 MG capsule 100MG PO HS  X 3 DAYS, THEN 200MG  capsule 3     [DISCONTINUED] gabapentin (NEURONTIN) 100 MG capsule Take 2 capsules by mouth 2 times daily         Allergies  Allergies   Allergen Reactions     Fentanyl Shortness Of Breath     Midazolam Shortness Of Breath     Ampicillin Diarrhea     Reaction: Severe diarrhea.     Ampicillin Benzathine GI Disturbance     Hydrochlorothiazide Photosensitivity     Ibuprofen      Penicillins GI Disturbance     Ragweeds Other (See Comments)     Nasal congestion/drainage, watery itchy eyes         Family History  Denies family h/o thyroid disease  Mother  at age of 87 from dementia  Father  when the patient was 5 yo, he said the heart disease runs in his father side    Social History  - Smoking cigarettes 1/2 ppd for 67 years  - Alcohol: 1 drink of arturo  "daily  - He is living with his wife,  for 51 years.  - He has 3 biological sons age of 41, 44, 47  - Retired at age of 61. He used to be an ,     Physical Exam  /77  Pulse 60  Ht 1.727 m (5' 8\")  Wt 65.1 kg (143 lb 8 oz)  BMI 21.82 kg/m2  Body mass index is 21.82 kg/(m^2).  GENERAL :  In no apparent distress  SKIN: Normal color, normal temperature, texture.  No hirsutism, alopecia or purple striae.     EYES: PERRL, EOMI, No scleral icterus,  No proptosis, conjunctival redness, stare, retraction. No visual field defect.  MOUTH: Moist, pink; pharynx clear  NECK: No visible masses. No palpable adenopathy, or masses.   THYROID:  Normal, nontender, smooth / firm texture,  no nodules, no Bruit   RESP: Lungs clear to auscultation bilaterally  CARDIAC: Regular rate and rhythm, normal S1 S2, without murmurs  ABDOMEN: Normal bowel sounds; soft, nontender, no HSM or masses       NEURO: awake, alert, responds appropriately to questions.  Cranial nerves intact.  Moves all extremities; Gait normal.  No tremor of the outstretched hand.  DTRs  2+ both knees , motor power grade 5/5 all.   EXTREMITIES: No clubbing, cyanosis or edema.    DATA REVIEW    Results for ERICK DUGAN (MRN [id number]) as of 9/5/2018 22:42   Ref. 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 Found to have low FT4 0.69, low T3 56 and inappropriately normal TSH 1.06. This suggested central hypothyroidism. However, the patient has lost weight 25 lbs in 1 year " (which already gained some back), besides that he is clinical euthyroid. Pituitary axis assessment found to have low libido and ED, but this has been ~18 years which thought to be related to blood pressure meds, he has normal hair distribution. Also with age of 78 and co morbidities, ESRD on PD, his testosterone level could be in low-normal range from that. Other axis seems to be normal - - no galactorrhea or polyuria/polydipsia. No visual field defect on exam. He had CT brain from OSH done in 1/21/2018 which showed no acute pathology, but it is not pituitary dedicated. Due to patient is on dialysis, the thyroid function assay could be inaccurate, especially free T4. Will recheck TFT again with thyroxine equilibrium dialysis to confirm. Also will work up for pituitary involvement by checking FSH, LH, total testosterone, prolactin, morning cortisol which patient will have blood drawn at [facility] close to his house. Will follow up with the patient once labs result is back.     Orders Placed This Encounter   Procedures     TSH     Thyroxine Free by Equilibrium Dialysis     T4 free     Prolactin     Follicle stimulating hormone     Lutropin     Testosterone total     Cortisol     Albumin level       Patient was seen and discussed with .     Johnnie Jules MD  Endocrine fellow  [phone]        Endocrine Staff Note    The patient was seen and examined by me with .  Her note details our mutual findings and plan.    Kristina Blood MD

## 2018-09-06 ENCOUNTER — RECORDS - HEALTHEAST (OUTPATIENT)
Dept: ADMINISTRATIVE | Facility: OTHER | Age: 78
End: 2018-09-06

## 2018-09-11 ENCOUNTER — COMMUNICATION - HEALTHEAST (OUTPATIENT)
Dept: CARDIOLOGY | Facility: CLINIC | Age: 78
End: 2018-09-11

## 2018-09-14 DIAGNOSIS — E03.9 HYPOTHYROIDISM, UNSPECIFIED TYPE: ICD-10-CM

## 2018-09-14 LAB
ALBUMIN SERPL-MCNC: 2.7 G/DL (ref 3.4–5)
CORTIS SERPL-MCNC: 22.4 UG/DL (ref 4–22)
FSH SERPL-ACNC: 75.7 IU/L (ref 0.7–10.8)
LH SERPL-ACNC: 63.3 IU/L (ref 3.1–34.6)
PROLACTIN SERPL-MCNC: 50 UG/L (ref 2–18)
T4 FREE SERPL-MCNC: 0.81 NG/DL (ref 0.76–1.46)
TSH SERPL DL<=0.005 MIU/L-ACNC: 2.12 MU/L (ref 0.4–4)

## 2018-09-18 LAB — TESTOST SERPL-MCNC: 33 NG/DL (ref 240–950)

## 2018-09-20 LAB — T4 FREE SERPL DIALY-MCNC: 1.6 NG/DL (ref 1.1–2.4)

## 2018-09-26 ENCOUNTER — HOSPITAL ENCOUNTER (OUTPATIENT)
Dept: OCCUPATIONAL THERAPY | Facility: CLINIC | Age: 78
Setting detail: THERAPIES SERIES
End: 2018-09-26
Attending: PSYCHIATRY & NEUROLOGY
Payer: MEDICARE

## 2018-09-26 PROCEDURE — 40000125 ZZHC STATISTIC OT OUTPT VISIT: Performed by: OCCUPATIONAL THERAPIST

## 2018-09-26 PROCEDURE — G8988 SELF CARE GOAL STATUS: HCPCS | Mod: GO,CH | Performed by: OCCUPATIONAL THERAPIST

## 2018-09-26 PROCEDURE — G8989 SELF CARE D/C STATUS: HCPCS | Mod: GO,CH | Performed by: OCCUPATIONAL THERAPIST

## 2018-09-26 PROCEDURE — 97535 SELF CARE MNGMENT TRAINING: CPT | Mod: GO | Performed by: OCCUPATIONAL THERAPIST

## 2018-09-26 PROCEDURE — G8987 SELF CARE CURRENT STATUS: HCPCS | Mod: GO,CH | Performed by: OCCUPATIONAL THERAPIST

## 2018-09-26 PROCEDURE — G9169 MEMORY GOAL STATUS: HCPCS | Mod: GO,CJ | Performed by: OCCUPATIONAL THERAPIST

## 2018-09-26 PROCEDURE — G9168 MEMORY CURRENT STATUS: HCPCS | Mod: GO,CJ | Performed by: OCCUPATIONAL THERAPIST

## 2018-09-26 PROCEDURE — 97165 OT EVAL LOW COMPLEX 30 MIN: CPT | Mod: GO | Performed by: OCCUPATIONAL THERAPIST

## 2018-09-26 PROCEDURE — G9170 MEMORY D/C STATUS: HCPCS | Mod: GO,CJ | Performed by: OCCUPATIONAL THERAPIST

## 2018-09-26 PROCEDURE — 96125 COGNITIVE TEST BY HC PRO: CPT | Mod: GO | Performed by: OCCUPATIONAL THERAPIST

## 2018-09-26 ASSESSMENT — ACTIVITIES OF DAILY LIVING (ADL)
IADL_QUICK_ADDS: MEAL PLANNING/PREPARATION;HOME/FINANCIAL/MANAGEMENT;COMMUNICATION/COMPUTER USE;COMMUNITY MOBILITY;CARE OF OTHERS

## 2018-09-26 ASSESSMENT — VISUAL ACUITY: OU: NEEDS READING GLASSES

## 2018-09-26 NOTE — PROGRESS NOTES
09/26/18 1000   Quick Adds   Quick Adds Certification   Type of Visit Initial Outpatient Occupational Therapy Evaluation   General Information   Start Of Care Date 09/26/18   Referring Physician Harlan Calvert   Orders Evaluate and treat as indicated   Other Orders safety eval   Orders Date 08/22/18   Medical Diagnosis Psuedobulbar effect/palsy   Onset of Illness/Injury or Date of Surgery 08/22/18  (order)   Surgical/Medical History Reviewed Yes   Additional Occupational Profile Info/Pertinent History of Current Problem 78 year old male pseudobulbar effect with delirium most recently in Jan with PMH of CAD and stent placement, ESRD, COPd, and prostate cancer   Comments/Observations here today with spouse   Role/Living Environment   Current Community Support Family/friend caregiver   Patient role/Employment history Retired   Community/Avocational Activities Politics   Current Living Environment House   Number of Stairs to Enter Home ramp in the front with rail   Primary Bathroom Set Up/Equipment Shower grab bar   Patient/family Goals Statement To determine if I have alzheimers   Pain   Patient currently in pain Denies   Fall Risk Screen   Fall screen completed by OT   Have you fallen 2 or more times in the past year? Yes   Have you fallen and had an injury in the past year? No   Fall screen comments falls outside mostly in the yard   Cognitive Status Examination   Orientation Orientation to person, place and time   Level of Consciousness Alert   Follows Commands and Answers Questions 75% of the time   Personal Safety and Judgment At risk behaviors demonstrated  (impulsive)   Memory Impaired   Memory Comments 1/5 delayed recal   Attention Distractible during evaluation;Sustained attention impaired;Alternating attention impaired, difficulty shifting between tasks;Divided attention impaired, difficulty with simultaneous tasks   Executive Function Impulsive;Cognitive flexibility impaired;Self awareness/monitoring  impaired   Cognitive Comment MOCA: 21/30   Visual Perception   Visual Perception No deficits were identified   Visual Acuity Needs reading glasses   Posture   Posture Kyphosis;Forward head position   Functional Mobility   Ambulation No AD   Bathing   Level of [place] - Bathing independent   Upper Body Dressing   Level of [place]: Dress Upper Body independent   Lower Body Dressing   Level of [place]: Dress Lower Body independent   Toileting   Level of [place]: Toilet independent   Grooming   Level of [place]: Grooming independent   Eating/Self-Feeding   Level of [place]: Eating independent   Instrumental Activities of Daily Living Assessment   IADL Assessment/Observations Wife sets up pill box   IADL Quick Adds Meal Planning/Preparation;Home/Financial/Management;Communication/Computer Use;Community Mobility;Care of Others   Meal Planning/Preparation wife completes now, no longer grilling   Home/Financial Management pays his own bills   Communication/Computer Use Hard to get used to new software system   Community Mobility driving less since shingles, rarely drives long distances   Planned Therapy Interventions   Planned Therapy Interventions Cognitive skills;Cognitive performance testing   Adult OT Eval Goals   OT Eval Goals (Adult) 1   OT Goal 1   Goal Identifier CPT   Goal Description Patient will participate in CPT and understand rec. based on results   Target Date 09/26/18   Clinical Impression   Criteria for Skilled Therapeutic Interventions Met Yes, treatment indicated   OT Diagnosis mild cognitive impairment   Influenced by the following impairments decreased safety and independence with ialds such as med mgmt, meal prep, driving   Assessment of Occupational Performance 1-3 Performance Deficits   Clinical Decision Making (Complexity) Low complexity   Therapy Frequency one eval and treat   Predicted Duration of Therapy Intervention (days/wks) once   Risks and Benefits of Treatment  have been explained. Yes   Patient, Family & other staff in agreement with plan of care Yes   Clinical Impression Comments Skilled OT assesment needed to addressed cognition and safety for proper reccomendations.   Education Assessment   Barriers To Learning Emotional;Cognitive   Preferred Learning Style Listening;Demonstration   Therapy Certification   Certification date from 09/26/18   Certification date to 09/26/18   Total Evaluation Time   Total Evaluation Time 30

## 2018-09-26 NOTE — PROGRESS NOTES
Cognitive Performance Test    SUMMARY OF TEST:    The Cognitive Performance Test (CPT) is a standardized performance-based assessment to measure working memory/executive function processing capacities that underlie functional performance. Subtasks include common basic and instrumental activities of daily living (ADL/IADL) which are rated based on the manner in which patients respond to task demands of varying complexity. The total CPT score describes a level of functioning that indicates how information is processed, implications for functional activities, potential safety risks and a recommended level of supervision or assist based on cognitive function. The highest total score on this test is in the range of 5.6 to 5.8.    DATE OF TESTING: September 26, 2018    RESULTS OF TESTING:                                                                                         CPT Subtest Results    MEDBOX: 5/6 SHOP/GLOVES: 6/6 PHONE: 5/6   WASH:  5/5 TRAVEL: 6/6 TOAST: 5/5   DRESS: NT/5   TOTAL CPT SCORE:  32/34     Average CPT Score  5.3/5.6    INTERPRETATION OF TEST RESULTS:     Based on the Cognitive Performance Test, this patient scored at CPT Level 5.0.  See CPT Levels reference below.    Summary of functional cognitive status:   Patient is very impulsive and talkative, requires cues to slow down and stop talking (fills all silent time with .  Acts without listening to full directions.  Clear behavioral changes have occurred.      The only safety concerns are related to impulsivity.  If driving concerns come up a formal driving eval should be done, but no needed at this point due to limited concerns.    During phone tasks, patient worked very quickly and was unable to remember phone number from phone book to phone.  Took 7 attempts before he asked for a piece of paper without even seeing one in front of him.    Recommend Neuropsych eval for more in depth assessment and baseline rec. As changes are more behavior vs        Factors affecting performance:  Impaired vision  Impulsive actions    Recommendations:    Supervision for ADL/IADL:  Shopping, Finances, Driving and Medication management                                                       TIME ADMINISTERING TEST: 45    TIME FOR INTERPRETATION AND PREPARATION OF REPORT: 15    TOTAL TIME: 60    CPT Levels Reference:    Patient's Average CPT Score:  5.3                                                                                                                                                  Individual scores range along a continuum as outlined below.  In addition to cognitive status, other factors may affect safety in a home environment.  Please refer to specific recommendations for this patient.    ___5.6-5.8  Normal functioning (absence of cognitive-functional disability).  Independent in managing personal affairs, monitors and directs own behavior.  Uses complex information to carry out daily activities with safety and accuracy.    Proficient with instrumental activities of daily living (IADL) and learning new activity.  Problems are anticipated, errors are avoided, and consequences of actions are considered.      __x_5.0   Mild cognitive-functional disability; deficits in working memory and executive thought processes. Difficulty using complex information. Problems may be observed with recent memory, judgment, reasoning and planning ahead. May be impulsive or have difficulty anticipating consequences.  Safety:  May require assistance to plan ahead; or to manage complex medication schedules, appointments or finances.  Hazardous activities may need to be monitored or limited.  ADL:  Mild functional decline.  Able to complete basic self-care and routine household tasks.  May have difficulty with complex daily tasks such as reading, writing, meal preparation, shopping or driving.   Learns through hands on teaching. Self-centered behavior or difficulty considering the needs  "of others may be seen related to trouble seeing the  whole picture\". Can appear disorganized or uninhibited.    ___4.5  Mild to moderate cognitive-functional disability. Significant deficits in working memory and executive thought processes. Judgment, reasoning and planning show obvious impairment.  Distractible with inability to shift attention/actions given competing stimuli.  Difficulty with problem solving and managing details. Complex daily tasks performed with inconsistency, difficulty, or error.     Safety:  Medications should be monitored, stove use may require supervision, and driving ability may be affected.  Impaired safety awareness with inability to anticipate potential problems.  May not recognize or respond to emergent situations. Requires frequent check-in support.   ADL:  Mild difficulty with simple everyday self-care tasks. Benefits from structured, routine activity.  Will likely need reminders to complete tasks outside of the routine. Requires assistance with planning and IADL tasks like shopping and finances. Learns concrete tasks through repetition, but performance may not generalize. Tends to be impulsive with poor insight. Self centered behavior or inability to consider the needs of others is common.    ___4.0  Moderate cognitive-functional disability; abstract to concrete thought processes. Working memory and executive function impairments are obvious. Difficulty with planning and problem solving.  Behavior is goal-directed, but unable to follow multi-step directions, is easily distracted, and may not recognize mistakes.  Inability to anticipate hazards or understand precautions.  Safety:  Recommend 24-hour supervision for safety. Supervision needed for medication management and for hazardous activities. May not be able to follow a restricted diet. Can get lost in unfamiliar surroundings. Generally, persons functioning at level 4 should not be driving.   ADL:  Some decline in quality or " frequency of ADL.  [place] enhanced by use of a routine, simple concrete directions, and caregiver set-up of needed items. Complex tasks such as money or home management typically requires assistance.  Relies heavily on vision to guide behavior; will ignore objects/hazards not in plain sight and can be distracted by irrelevant objects. Often has poor insight.  Able to carry out social conversation and may verbally  cover  for deficits leading caregivers to believe they are capable of functioning independently.       ___3.5  Moderate cognitive-functional disability; increased cues needed for task completion. Aware of concrete task steps but needs prompting or cues to initiate and complete simple tasks. Attention span is limited, simple directions may need to be repeated, and re-focus to a topic or task may be required.  Safety:  24-hour supervision required for safety and for assistance with daily tasks. Assistance required with medications, and access to medication should be limited. Meals, nutrition and dietary restrictions need to be monitored.  All hazardous activities should be restricted or supervised. Should not drive. Prone to wandering and can become lost.  ADL:  Moderate functional decline. Familiar tasks usually requires set-up of supplies and directions to complete steps. May need objects handed to them for task initiation. Function best with a set schedule in familiar surroundings with familiar people. All complex tasks must be done by others. Vocabulary is diminished and speech often unfocused.     Electronically signed by:  Sydni ALLEN, ATP      Occupational Therapist, Assistive   [phone]      fax: [phone]      [email or web address], [address]  [address]  [place] [address]

## 2018-09-26 NOTE — PROGRESS NOTES
[facility]  OUTPATIENT OCCUPATIONAL THERAPY  EVALUATION  PLAN OF TREATMENT FOR OUTPATIENT REHABILITATION  (COMPLETE FOR INITIAL CLAIMS ONLY)  Patient's Last Name, First Name, M.I.  YOB: 1940  Erick Ortiz                           Provider's Name  [facility] Medical Record No.  [id number]                               Onset Date:     08/22/18 (order)   Start of Care Date:     09/26/18   Type:     ___PT   _X_OT   ___SLP Medical Diagnosis:     Psuedobulbar effect/palsy                          OT Diagnosis:     mild cognitive impairment Visits from SOC:  1   _________________________________________________________________________________  Plan of Treatment/Functional Goals:  Cognitive skills, Cognitive performance testing    Goals  Goal Identifier: CPT  Goal Description: Patient will participate in CPT and understand rec. based on results  Target Date: 09/26/18  Date Met: 09/26/18    Therapy Frequency: one eval and treat     Predicted Duration of Therapy Intervention (days/wks): once  Sydni Reynaga OT          I CERTIFY THE NEED FOR THESE SERVICES FURNISHED UNDER        THIS PLAN OF TREATMENT AND WHILE UNDER MY CARE     (Physician co-signature of this document indicates review and certification of the therapy plan).              Certification date from: 09/26/18, Certification date to: 09/26/18               Referring Physician: Harlan Calvert     Initial Assessment        See Epic Evaluation      Start Of Care Date: 09/26/18

## 2018-09-27 NOTE — PROGRESS NOTES
[facility]  OUTPATIENT OCCUPATIONAL THERAPY  EVALUATION  PLAN OF TREATMENT FOR OUTPATIENT REHABILITATION  (COMPLETE FOR INITIAL CLAIMS ONLY)  Patient's Last Name, First Name, M.I.  YOB: 1940  Erick Ortiz                           Provider's Name  [facility] Medical Record No.  [id number]                               Onset Date:     08/22/18 (order)   Start of Care Date:     09/26/18   Type:     ___PT   _X_OT   ___SLP Medical Diagnosis:     Psuedobulbar effect/palsy                          OT Diagnosis:     mild cognitive impairment Visits from SOC:  1   _________________________________________________________________________________  Plan of Treatment/Functional Goals:  Cognitive skills, Cognitive performance testing    Goals  Goal Identifier: CPT  Goal Description: Patient will participate in CPT and understand rec. based on results  Target Date: 09/26/18  Date Met: 09/26/18    Therapy Frequency: one eval and treat     Predicted Duration of Therapy Intervention (days/wks): once  Sydni Reynaga OT          I CERTIFY THE NEED FOR THESE SERVICES FURNISHED UNDER        THIS PLAN OF TREATMENT AND WHILE UNDER MY CARE     (Physician co-signature of this document indicates review and certification of the therapy plan).              Certification date from: 09/26/18, Certification date to: 09/26/18               Referring Physician: Harlan Calvert     Initial Assessment        See Epic Evaluation      Start Of Care Date: 09/26/18

## 2018-10-05 ENCOUNTER — OFFICE VISIT (OUTPATIENT)
Dept: NEUROLOGY | Facility: CLINIC | Age: 78
End: 2018-10-05
Payer: COMMERCIAL

## 2018-10-05 VITALS
BODY MASS INDEX: 21.67 KG/M2 | DIASTOLIC BLOOD PRESSURE: 82 MMHG | HEART RATE: 70 BPM | OXYGEN SATURATION: 94 % | WEIGHT: 143 LBS | HEIGHT: 68 IN | SYSTOLIC BLOOD PRESSURE: 164 MMHG

## 2018-10-05 DIAGNOSIS — R47.89: Primary | ICD-10-CM

## 2018-10-05 DIAGNOSIS — R41.3 MEMORY LOSS: ICD-10-CM

## 2018-10-05 ASSESSMENT — PAIN SCALES - GENERAL: PAINLEVEL: NO PAIN (0)

## 2018-10-05 NOTE — NURSING NOTE
Chief Complaint   Patient presents with     RECHECK     [facility] RETURN - FOLLOW UP TO OT       Alberto Posada, EMT

## 2018-10-05 NOTE — MR AVS SNAPSHOT
"              After Visit Summary   10/5/2018    Erick Ortiz    MRN: [id number]           Patient Information     Date Of Birth          1940        Visit Information        Provider Department      10/5/2018 3:30 PM Harlan Calvert MD [facility] Neurology        Today's Diagnoses     Logorrhea    -  1    Memory loss           Follow-ups after your visit        Follow-up notes from your care team     Return in about 2 months (around 12/5/2018).      Your next 10 appointments already scheduled     Oct 22, 2018  8:00 AM CDT   (Arrive by 7:45 AM)   Neuropsych Eval with Jonny Sloan, PhD [facility] Neuropsychology ([facility])    [address] [place] [address] [phone]            Dec 06, 2018  2:00 PM CST   (Arrive by 1:45 PM)   Return Visit with Harlan Calvert MD   [facility] Neurology ([facility])    [address] [place] [address] [phone]              Who to contact     Please call your clinic at [phone] to:    Ask questions about your health    Make or cancel appointments    Discuss your medicines    Learn about your test results    Speak to your doctor            Additional Information About Your Visit        Care EveryWhere ID     This is your Care EveryWhere ID. This could be used by other organizations to access your [place] medical records  [id number]        Your Vitals Were     Pulse Height Pulse Oximetry BMI (Body Mass Index)          70 1.727 m (5' 8\") 94% 21.74 kg/m2         Blood Pressure from Last 3 Encounters:   10/05/18 164/82   09/05/18 155/77   08/31/18 151/79    Weight from Last 3 Encounters:   10/05/18 64.9 kg (143 lb)   09/05/18 65.1 kg (143 lb 8 oz)   08/31/18 61.7 kg (136 lb)              We Performed the Following     PSYCHOLOGICAL TESTING (M.A.)        Primary Care Provider Office Phone # Fax #    Sandro Mcdaniel [phone]    "    [facility] [address] [facility] [address]        Equal Access to Services     JONNIE KYLE : Hadii aad eric haddouglaso Sojyotsna, waaxda luqadaha, qaybta kaalmada shalinicarlee, earl mingin hayaatiara loyamitali bermeo jose benson. So [facility] [phone].    ATENCIÓN: Si habla español, tiene a simon disposición servicios gratuitos de asistencia lingüística. Llame al [phone].    We comply with applicable federal civil rights laws and [place] laws. We do not discriminate on the basis of race, color, national origin, age, disability, sex, sexual orientation, or gender identity.            Thank you!     Thank you for choosing [facility] NEUROLOGY  for your care. Our goal is always to provide you with excellent care. Hearing back from our patients is one way we can continue to improve our services. Please take a few minutes to complete the written survey that you may receive in the mail after your visit with us. Thank you!             Your Updated Medication List - Protect others around you: Learn how to safely use, store and throw away your medicines at www.disposemymeds.org.          This list is accurate as of 10/5/18  4:25 PM.  Always use your most recent med list.                   Brand Name Dispense Instructions for use Diagnosis    amitriptyline 25 MG tablet    ELAVIL     Take 1 tablet by mouth At Bedtime        amLODIPine 5 MG tablet    NORVASC      Encephalopathy, Personality change, Loss of weight       aspirin 81 MG tablet      Take 1 tablet by mouth daily        CALCIUM + D PO      Take 1 tablet by mouth daily        CENTRUM SILVER ADULT 50+ PO      Take 1 tablet by mouth daily        * cloNIDine 0.1 MG tablet    CATAPRES     Take 0.1 mg by mouth 2 times daily        * cloNIDine 0.2 MG tablet    CATAPRES     Take 1 tablet by mouth 2 times daily        Co Q 10 100 MG Caps      Take 1 capsule by mouth daily        gabapentin 100 MG capsule    NEURONTIN    360 capsule    Use 200mg bid    Herpes zoster,  Idiopathic progressive polyneuropathy       IRON PO      Take 1 tablet by mouth    Encephalopathy, Personality change, Loss of weight       LASIX 80 MG tablet   Generic drug:  furosemide      Take 1 tablet by mouth daily        lisinopril 20 MG tablet    PRINIVIL/ZESTRIL     2 tab PO every morning, 1 tab PO every evening        metoprolol succinate 50 MG 24 hr tablet    TOPROL-XL     Take 1 tablet by mouth daily        nitroGLYcerin 0.4 MG sublingual tablet    NITROSTAT     Place 1 tablet under the tongue every 5 minutes as needed        potassium chloride SA 20 MEQ CR tablet    K-DUR/KLOR-CON M     Take 1 tablet by mouth 2 times daily        pravastatin 80 MG tablet    PRAVACHOL     Take 1 tablet by mouth daily        ranitidine 150 MG tablet    ZANTAC     Take 1 tablet by mouth as needed        vitamin D3 68438 UNITS capsule    CHOLECALCIFEROL     Take 1 capsule by mouth every 7 days        * WARFARIN SODIUM PO      Take as directed per INR clinic        * warfarin 2 MG tablet    COUMADIN     Take 4 mg on 1/28, redose on 1/29 based on INR per primary MDAdjust dose based on INR results as directed.    Encephalopathy, Personality change, Loss of weight       * Notice:  This list has 4 medication(s) that are the same as other medications prescribed for you. Read the directions carefully, and ask your doctor or other care provider to review them with you.

## 2018-10-05 NOTE — LETTER
10/5/2018       RE: Erick Ortiz  [address] [place] [address]     Dear Colleague,    Thank you for referring your patient, Erick Ortiz, to the [facility] NEUROLOGY at [facility]. Please see a copy of my visit note below.    Service Date: 10/05/2018      Clint Jacobsen MD   [facility]    [address] [place] [address]      RE: Erick Ortiz   MRN: [id number]   : 1940      Dear Dr. Jacobsen:      This is in regard to followup on Erick Ortiz.  The patient returned today with the chief complaint of encephalopathy and logorrhea.      The patient's logorrhea has probably improved to a minor degree since he started gabapentin.  He has had no trouble taking the medicine.  It has helped him sleep also.  He and his wife both understand that he has this condition of pressured speech.  The patient has not had any daytime sedation from the medication.  He denied any trouble driving.  There is no history of depression.  He has not had swelling.  He did have an endocrine examination here by Dr. Jeremi MD.  I reviewed that record from 2018.  The patient did have a number of tests done.  His prolactin level is high at 50 and he had a high lutropin.  He also had a high FSH.  His cortisol level was slightly elevated at 22.4.  He had a low albumin of 2.7 and a low testosterone of 33.      The patient was evidently sent a letter that these tests were normal for someone in his age group.  He is not scheduled to go back there.  The patient did tell me that he has continued to have issues with logorrhea and his wife indicated that to me, too.  He has had possible memory loss and also I thought that he may have not only a personality change but also possibly a pseudobulbar affect.  The patient did have evaluation and it was felt on 2018 that he had mild cognitive-functional disability.      CURRENT MEDICATIONS:   The patient has continued on  "the same dosage of medications.  I reviewed this with him from the Epic website.  These included:   1.  Amitriptyline 25 mg.     2.  Norvasc.     3.  Lasix.   4.  Gabapentin 100 mg b.i.d.   5.  Oral iron.     6.  Prinivil.     7.  Toprol.     8.  Multivitamin.   9.  p.r.n. nitroglycerin.     10.  Pravachol.     11.  Zantac.     12.  Coumadin.     13.  Aspirin.     14.  Calcium carbonate and vitamin D.     15.  Catapres.     16.  Coenzyme Q10.     17.  Potassium chloride.     18.  Warfarin.        He is going to have further followup regarding his use of medication here at [facility].      I reviewed with them the summary of his cognitive performance test done by Sydni Reynaga OT from 09/26/2018.  She noted that he was impulsive and talkative and requires cues to slow down and stop talking.  He acts without listening to full directions and he had clear behavioral changes that have occurred.  His only safety concerns were related to impulsivity.  She went on, \"If driving concerns come up, a formal driving evaluation should be done, but no need at this point due to limited concerns\".  He did review all of this with me and she did recommend formal neuropsych evaluation.      The patient's last CT scan on 01/21/2018 showed atrophy and he had lesions in the thalamus.  He and his wife did review again with me that episode of delirium that led him to be hospitalized then.      Neurologic examination revealed a pleasant man.  His blood pressure was initially found by the technician to be 164/82 with a pulse of 70.  I retook it with a soft cuff and it was 140/70.  He is in atrial fibrillation and has an irregular/regular pulse.  He had expiratory slowing on auscultation of his lungs related to his chronic obstructive lung disease.  He did not have any cardiac murmur and had no cervical bruits.  He was oriented to 10/05/2018.  He knew where he was.  He could tell me that Larry was the president and could recall 3/3 objects after 5 " minutes and could draw a clock correctly to state 4:30.  The patient was able to repeat this on further verbal testing.      I could not auscultate cervical bruits.  He had expiratory slowing on auscultation of his lungs.  He did not have cervical bruits.      IMPRESSION:   1.  Prior history of encephalopathy with improvement status post hospitalization with possible multiple risk factors including his known renal insufficiency.   2.  Logorrhea.   3.  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      The patient has a number of issues.  He is going to go ahead now with neuropsychiatric evaluation and testing.  He will have neurologic followup after it is done.  I have also reviewed his condition in general and his partial response to gabapentin.  I have asked that he now increase the dose to 300 mg at night or 100 mg in the morning and 200 mg at night.  I cautioned him about his renal failure and his need to be carefully monitored on gabapentin.  Depending on how he does I will make other recommendations.      Thank you for allowing me to see this patient.      Sincerely yours,       Argenis Calvert MD      I spent 20 minutes with the patient and his wife today.  Over 50% of the time this involved counseling and coordination of care.         ARGENIS CALVERT MD             D: 10/13/2018   T: 10/15/2018   MT: AKA      Name:     KEISHA DUGAN   MRN:      [id number]        Account:      [id number]   :      1940           Service Date: 10/05/2018      Document: [id number]

## 2018-10-15 NOTE — PROGRESS NOTES
Service Date: 10/05/2018      Clint Jacobsen MD   [facility]    [address] [id number]      RE: Erick Ortiz   MRN: [id number]   : 1940      Dear Dr. Jacobsen:      This is in regard to followup on Erick Ortiz.  The patient returned today with the chief complaint of encephalopathy and logorrhea.      The patient's logorrhea has probably improved to a minor degree since he started gabapentin.  He has had no trouble taking the medicine.  It has helped him sleep also.  He and his wife both understand that he has this condition of pressured speech.  The patient has not had any daytime sedation from the medication.  He denied any trouble driving.  There is no history of depression.  He has not had swelling.  He did have an endocrine examination here by Dr. Jeremi MD.  I reviewed that record from 2018.  The patient did have a number of tests done.  His prolactin level is high at 50 and he had a high lutropin.  He also had a high FSH.  His cortisol level was slightly elevated at 22.4.  He had a low albumin of 2.7 and a low testosterone of 33.      The patient was evidently sent a letter that these tests were normal for someone in his age group.  He is not scheduled to go back there.  The patient did tell me that he has continued to have issues with logorrhea and his wife indicated that to me, too.  He has had possible memory loss and also I thought that he may have not only a personality change but also possibly a pseudobulbar affect.  The patient did have evaluation and it was felt on 2018 that he had mild cognitive-functional disability.      CURRENT MEDICATIONS:   The patient has continued on the same dosage of medications.  I reviewed this with him from the Epic website.  These included:   1.  Amitriptyline 25 mg.     2.  Norvasc.     3.  Lasix.   4.  Gabapentin 100 mg b.i.d.   5.  Oral iron.     6.  Prinivil.     7.  Toprol.     8.  Multivitamin.   9.   "p.r.n. nitroglycerin.     10.  Pravachol.     11.  Zantac.     12.  Coumadin.     13.  Aspirin.     14.  Calcium carbonate and vitamin D.     15.  Catapres.     16.  Coenzyme Q10.     17.  Potassium chloride.     18.  Warfarin.        He is going to have further followup regarding his use of medication here at [facility].      I reviewed with them the summary of his cognitive performance test done by Sydni Reynaga OT from 09/26/2018.  She noted that he was impulsive and talkative and requires cues to slow down and stop talking.  He acts without listening to full directions and he had clear behavioral changes that have occurred.  His only safety concerns were related to impulsivity.  She went on, \"If driving concerns come up, a formal driving evaluation should be done, but no need at this point due to limited concerns\".  He did review all of this with me and she did recommend formal neuropsych evaluation.      The patient's last CT scan on 01/21/2018 showed atrophy and he had lesions in the thalamus.  He and his wife did review again with me that episode of delirium that led him to be hospitalized then.      Neurologic examination revealed a pleasant man.  His blood pressure was initially found by the technician to be 164/82 with a pulse of 70.  I retook it with a soft cuff and it was 140/70.  He is in atrial fibrillation and has an irregular/regular pulse.  He had expiratory slowing on auscultation of his lungs related to his chronic obstructive lung disease.  He did not have any cardiac murmur and had no cervical bruits.  He was oriented to 10/05/2018.  He knew where he was.  He could tell me that Larry was the president and could recall 3/3 objects after 5 minutes and could draw a clock correctly to state 4:30.  The patient was able to repeat this on further verbal testing.      I could not auscultate cervical bruits.  He had expiratory slowing on auscultation of his lungs.  He did not have cervical bruits.    "   IMPRESSION:   1.  Prior history of encephalopathy with improvement status post hospitalization with possible multiple risk factors including his known renal insufficiency.   2.  Logorrhea.   3.  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      The patient has a number of issues.  He is going to go ahead now with neuropsychiatric evaluation and testing.  He will have neurologic followup after it is done.  I have also reviewed his condition in general and his partial response to gabapentin.  I have asked that he now increase the dose to 300 mg at night or 100 mg in the morning and 200 mg at night.  I cautioned him about his renal failure and his need to be carefully monitored on gabapentin.  Depending on how he does I will make other recommendations.      Thank you for allowing me to see this patient.      Sincerely yours,       Argenis Calvert MD      I spent 20 minutes with the patient and his wife today.  Over 50% of the time this involved counseling and coordination of care.         ARGENIS CALVERT MD             D: 10/13/2018   T: 10/15/2018   MT: AKA      Name:     KEISHA DUGAN   MRN:      [id number]        Account:      [id number]   :      1940           Service Date: 10/05/2018      Document: [id number]

## 2018-10-22 ENCOUNTER — OFFICE VISIT (OUTPATIENT)
Dept: NEUROPSYCHOLOGY | Facility: CLINIC | Age: 78
End: 2018-10-22
Payer: COMMERCIAL

## 2018-10-22 DIAGNOSIS — R41.844 FRONTAL LOBE AND EXECUTIVE FUNCTION DEFICIT: Primary | ICD-10-CM

## 2018-10-22 DIAGNOSIS — F09 MENTAL DISORDER, ORGANIC: ICD-10-CM

## 2018-10-22 NOTE — NURSING NOTE
The patient was seen for neuropsychological evaluation at the request of Dr. Harlan Calvert for the purpose of diagnostic clarification and treatment planning.  2 hours of face to face testing were provided by this writer.  Please see Dr. Jonny Sloan's report for a full interpretation of the findings.

## 2018-10-22 NOTE — MR AVS SNAPSHOT
After Visit Summary   10/22/2018    Erick Ortiz    MRN: [id number]           Patient Information     Date Of Birth          1940        Visit Information        Provider Department      10/22/2018 8:00 AM Jonny Sloan, PhD [facility] Neuropsychology        Today's Diagnoses     Frontal lobe and executive function deficit    -  1    Mental disorder, organic           Follow-ups after your visit        Your next 10 appointments already scheduled     Dec 06, 2018  2:00 PM CST   (Arrive by 1:45 PM)   Return Visit with Harlan Calvert MD   [facility] Neurology ([facility])    [address] [place] [address] [phone]              Who to contact     Please call your clinic at [phone] to:    Ask questions about your health    Make or cancel appointments    Discuss your medicines    Learn about your test results    Speak to your doctor            Additional Information About Your Visit        Care EveryWhere ID     This is your Care EveryWhere ID. This could be used by other organizations to access your [place] medical records  [id number]         Blood Pressure from Last 3 Encounters:   10/05/18 164/82   09/05/18 155/77   08/31/18 151/79    Weight from Last 3 Encounters:   10/05/18 64.9 kg (143 lb)   09/05/18 65.1 kg (143 lb 8 oz)   08/31/18 61.7 kg (136 lb)              We Performed the Following     [id number] TESTING, PER HR/PSYCHOLOGIST     NEUROPSYCH TESTING BY [facility]        Primary Care Provider Office Phone # Fax #    Sandro Mcdaniel [phone]       [facility] [address] [facility] [address]        Equal Access to Services     [facility] AH: Hadii nohemi reyes hadasho Sojyotsna, waaxda luqadaha, qaybta kaalmada adecarlee, earl benson. [facility] [phone].    ATENCIÓN: Si habla español, tiene a simon disposición servicios gratuitos de asistencia  lingüística. Kylah al [phone].    We comply with applicable federal civil rights laws and [place] laws. We do not discriminate on the basis of race, color, national origin, age, disability, sex, sexual orientation, or gender identity.            Thank you!     Thank you for choosing [facility] NEUROPSYCHOLOGY  for your care. Our goal is always to provide you with excellent care. Hearing back from our patients is one way we can continue to improve our services. Please take a few minutes to complete the written survey that you may receive in the mail after your visit with us. Thank you!             Your Updated Medication List - Protect others around you: Learn how to safely use, store and throw away your medicines at www.disposemymeds.org.          This list is accurate as of 10/22/18 11:59 PM.  Always use your most recent med list.                   Brand Name Dispense Instructions for use Diagnosis    amitriptyline 25 MG tablet    ELAVIL     Take 1 tablet by mouth At Bedtime        amLODIPine 5 MG tablet    NORVASC      Encephalopathy, Personality change, Loss of weight       aspirin 81 MG tablet      Take 1 tablet by mouth daily        CALCIUM + D PO      Take 1 tablet by mouth daily        CENTRUM SILVER ADULT 50+ PO      Take 1 tablet by mouth daily        * cloNIDine 0.1 MG tablet    CATAPRES     Take 0.1 mg by mouth 2 times daily        * cloNIDine 0.2 MG tablet    CATAPRES     Take 1 tablet by mouth 2 times daily        Co Q 10 100 MG Caps      Take 1 capsule by mouth daily        gabapentin 100 MG capsule    NEURONTIN    360 capsule    Use 200mg bid    Herpes zoster, Idiopathic progressive polyneuropathy       IRON PO      Take 1 tablet by mouth    Encephalopathy, Personality change, Loss of weight       LASIX 80 MG tablet   Generic drug:  furosemide      Take 1 tablet by mouth daily        lisinopril 20 MG tablet    PRINIVIL/ZESTRIL     2 tab PO every morning, 1 tab PO every evening        metoprolol  succinate 50 MG 24 hr tablet    TOPROL-XL     Take 1 tablet by mouth daily        nitroGLYcerin 0.4 MG sublingual tablet    NITROSTAT     Place 1 tablet under the tongue every 5 minutes as needed        potassium chloride SA 20 MEQ CR tablet    K-DUR/KLOR-CON M     Take 1 tablet by mouth 2 times daily        pravastatin 80 MG tablet    PRAVACHOL     Take 1 tablet by mouth daily        ranitidine 150 MG tablet    ZANTAC     Take 1 tablet by mouth as needed        vitamin D3 82155 UNITS capsule    CHOLECALCIFEROL     Take 1 capsule by mouth every 7 days        * WARFARIN SODIUM PO      Take as directed per INR clinic        * warfarin 2 MG tablet    COUMADIN     Take 4 mg on 1/28, redose on 1/29 based on INR per primary MDAdjust dose based on INR results as directed.    Encephalopathy, Personality change, Loss of weight       * Notice:  This list has 4 medication(s) that are the same as other medications prescribed for you. Read the directions carefully, and ask your doctor or other care provider to review them with you.

## 2018-10-23 ENCOUNTER — AMBULATORY - HEALTHEAST (OUTPATIENT)
Dept: CARDIOLOGY | Facility: CLINIC | Age: 78
End: 2018-10-23

## 2018-10-23 DIAGNOSIS — Z95.0 PACEMAKER: ICD-10-CM

## 2018-10-24 NOTE — PROGRESS NOTES
"Name: Erick Ortiz  MR#: [id number]  YOB: 1940  Date of Exam: 10/22/2018    Neuropsychology Laboratory  [facility]  [address]  [place]  [address] [phone]  NEUROPSYCHOLOGICAL EVALUATION    IDENTIFYING INFORMATION  Erick Ortiz is an 78 year old, right handed, retired , with 14 years of formal education. He was accompanied to the evaluation by his wife, Nickie.    BACKGROUND INFORMATION / INTERVIEW FINDINGS    Records indicate that Mr. Ortiz s medical history includes orthostatic hypotension, herpes zoster, chronic/end stage renal failure (on dialysis), atrial fibrillation, coronary heart disease, heart attack, chronic obstructive lung disease, pseudobulbar affect, prostate cancer, and neuropathy in his right leg. He has had several hospitalizations over the past few years for which he had protracted encephalopathy. EEG monitoring on 08/22/2018 was read as abnormal and was felt to be consistent with mild electrographic encephalopathy, with additional left frontotemporal neuronal dysfunction. These findings were felt to be etiologically nonspecific. CT imaging of his head on 01/21/2018 documented unchanged mild diffuse parenchymal volume loss with ex vacuo ventricular dilatation. Concerns have been expressed about ongoing issues with his cognition, logorrhea, and possible personality changes. The current evaluation was requested by Dr. Harlan Calvert, in this context.    On interview, Mr. Ortiz and his wife confirmed the above history. His wife reported that he had normal cognition up until approximately 2013. She reported that he developed shingles in 2013, which  got into his nervous system  and then  into his brain.  She reported that he had cognitive issues for two months after this illness, and was unable to form a sentence during this period of time. She stated that since that time, he \"gets hospitalitis  every time he becomes ill. She " estimated that he has been in the hospital 4-6 times since 2013, and with each hospitalization, he has become encephalopathic. She noted that his cognition never seemed to return to baseline levels following the 2013 illness, but has recovered to his new baseline following each subsequent bout of encephalopathy. She reported that his last bout of encephalopathy occurred in January, 2018. At that point, he had been hospitalized for pneumonia.     Regarding current cognitive functioning, Mr. Ortiz reported his belief that he began noticing changes in his thinking approximate five years ago. He stated his belief that he has had ongoing difficulties with memory since that time. He described troubles with remembering sequences and other newly learned information. He indicated that he has always been terrible at remembering names, but now also has trouble with misplacing items. Additionally, he described troubles with word finding. The patient's wife reported that he has become more verbose, and spends much of the day talking. She also noted that he is more short tempered and irritable that he used to be. The patient's wife also reported that he had considerably reduced initiative for a sustained period of time following his bout with shingles, but she noted that he has had an improved initiative since the spring of this year.    Regarding mental health, the patient reported that his mood is pretty good. He stated that he has never been diagnosed with depression, anxiety, or other mental health related issues. He denied prior treatment for mental health related issues. He denied suicidal ideation.    With respect to other medical background, Mr. Ortiz denied prior TBI, stroke, or seizure. He reported that he sleeps soundly, and noted that his gabapentin prescription helps. He reported that he averages 9 to 10 hours of sleep per night, but may sleep as much as 11 or 12 hours. He reported that he slept eight hours the night  before the current exam. He denied pain. Per records, his current medications include amitriptyline, amlodipine, aspirin, calcium carbonate-vitamin D, clonidine, coenzyme Q10, furosemide, gabapentin, iron, lisinopril, metoprolol, multivitamin, nitroglycerin, potassium chloride, pravastatin, ranitidine, vitamin D3, and warfarin. He reported that he has been smoking since his teenage years, but is trying to quit and is down to three cigarettes per day. He stated that he stopped drinking alcohol two months ago. He denied illicit drug use. The patient and his wife noted that he drank more heavily in the past, and drank on a nightly basis since his 60s. His wife estimated that he consumed six ounces of arturo per night. She noted that he would become verbally abusive when he drank. He never had treatment for chemical dependency issues, and quit drinking on his own.    Mr. Ortiz lives at home with his wife. He manages his own basic daily activities. His wife helps him arranges medicines. May share management of the finances. His wife is responsible for meal preparation. He drives, but is not driving often these days. By way of background, the patient and his wife a been  for 52 years. They have three adult sons, all of whom live locally. Regarding educational background, he graduated from high school with good grades. He completed approximately two years of college coursework at the [employer], as well as additional accounting courses, but did not earn a degree. Professionally, he worked as an  in a variety of capacities over his career. He retired at age 61.    BEHAVIORAL OBSERVATIONS  Mr. Ortiz was polite and cooperative with the exam. He ambulated slowly. He was talkative. His speech was otherwise normal. Comprehension was normal. He required reorientation to the task on a few occasions due to his talkativeness, but his thought processes were otherwise unremarkable. His mood was neutral with  congruent affect. His effort was good. The current results are felt to be an accurate representation of his cognitive functioning.     RESULTS OF EXAM  His performances on measures of neuropsychological functioning were as follows:     He was oriented to time, place, and various aspects of personal information. He was able to state the names of the three most recent presidents in order, and also provided the name of another recent past president. Performance on a measure of single word reading was high average. Auditory attention for digits was high average. Metal calculations were superior. Learning of words in a list format was low average. Short delayed recall of list words was borderline impaired. Percent retention a list words was borderline impaired. Delayed recognition of the list words was borderline impaired. Learning of story information was high average. Delayed recall, and delayed recognition of story information were both average. Learning of simple geometric shapes and their spatial locations was average. Delayed recall of the shapes and their locations was low average. Percent retention the shapes was normal. Delayed recognition of the shapes was normal, and performed without error. His drawing of complicated geometric figure was normal, although notable for inattention to the figure s details. Visual problem-solving with blocks was superior. Visuospatial judgments for variably oriented lines were severely impaired. Comprehension of phrases and short stories was high average. Verbal associative fluency was average. Semantic verbal fluency was borderline impaired. Naming to confrontation was superior. Verbal abstract reasoning was average. Speeded visual sequencing under focused attention was average. A similar measure with a divided attention component was average. Speeded word reading was average. Speeded color naming was high average. Speeded inhibition of an overlearned response was performed in  the low average to average range. Speeded visual motor coding was average. Speeded fine motor dexterity was low average, bilaterally.    He endorsed items consistent with minimal symptoms of depression, and minimal symptoms of anxiety on self-report measures. His wife completed a measure designed to assess for changes in personality. On this measure, her ratings suggest that he has had increased initiative in the last six months, as well as increased impulsivity. She otherwise denied significant changes in personality.    IMPRESSIONS  Mr. Ortiz demonstrated a pattern of mild weaknesses that raises some question about mild dysfunction of left lateral frontal and mesial temporal brain regions. The current results are mildly abnormal. These findings are compatible with the results of his past EEG, although the etiology is unclear. In this exam, mild weaknesses and variability were identified in aspects of verbal fluency, verbally mediated executive functioning, and verbal anterograde memory. There was also an isolated weakness on a measure of visuospatial judgments (which could potentially be related to his past heavy alcohol use). Bilateral fine motor dexterity is also mildly slowed. Other cognitive abilities were intact and performed in keeping with his above average range cognitive baseline. He is not reporting elevated symptoms of depression or anxiety. On a measure of personality change, his wife noted increased impulsivity.    RECOMMENDATIONS  I made multiple attempts to reach the patient to provide feedback about test results on 10/23/2018 and 10/24/2018. I left multiple messages. Unfortunately, I was unable to reach the patient. If he is interested in feedback about this evaluation, he is encouraged to contact me at the number listed above.    1. If medically indicated, high-resolution brain imaging could aid in diagnostic clarification.    2. He will benefit from ongoing support and supervision of his daily  activities.    3. His memory will benefit from being provided with verbal information in a context or framework. Recognition cues may also help. Additionally, routine use of a memory notebook or other assistive device could aid his memory.     4. He is encouraged to exercise caution when driving.    5. Follow-up neuropsychological evaluation is recommended in one year in order to assess and update recommendations as appropriate. The current results can be used as a baseline at that time. However, I would be pleased to evaluate sooner if changes are noted or if clinically indicated.    Jonny Sloan, Ph.D., L.P., ABPP-CN   / Licensed Psychologist [id number]  Department of Rehabilitation Medicine  Division of Adult Neuropsychology  [facility]    Time spent: four hours professional time, including interview, record review, data integration, and report writing (CPT 15884); two hours of testing administered by a psychometrist and interpreted by a neuropsychologist (CPT 06539). Diagnoses: R41.844, F06.8.

## 2018-10-24 NOTE — PROGRESS NOTES
__ Orientation            Time          ___0____        Place        ____2____        Personal Info.     ____4____        Presidents                   4            WAIS-IV   FSIQ____VCI_____PRI_____ WMI____PSI____     Raw  Age SS  __Similarities  __24___ ___11___  __Vocabulary  _______ _______  __Information  _______          _______  __Comprehension _______ _______  __Block Design  __40___ ___14___  __Matrix Reas.  _______ _______  __Visual Puzzles _______ _______  __Picture Comp. _______ _______  __Figure Weights _______ _______  __Digit Span  __26___ ___12___  __Arithmetic  __16_____             __14__  __L-N Sequencing _______ _______  __Symbol Search _______ _______  __Coding  __40___ ___10___  __Cancellation  _______ _______    __HVLT - R        Trial    1      2        3      Total   _3_   _8_   _6_    _17_         Raw     Tscore       Immediate Recall _17_  _39_       Delayed Recall __4_  _35        Retention (%)  _50%  _32_       Rec/Dis Ind.  #Hits _10_   #FPs_3_  Tscore _31_    __BVMT - R        Trial    1      2        3      Total   _5_   _7_   _5_    _17_         Raw     Tscore       Immediate Recall __17_  45_       Delayed Recall __6_  _43_        Retention (%)  _86%_  _>16_       Rec/Dis Ind.  #Hits _6_   #FPs_0_  Tscore _>16_    __Rejyoti-Geovanna/Bev Complex Figure Test    Raw  T-score   %ile  Copy   __30__         -                  >16  Time               _204 __                          __WRAT-4   Reading SS = 114     %ile = 82    GE = >12.9    __COWA   Raw__33___ Tscore__45___  __Semantic Fluency/Animals   Raw = 11  Tscore = 35  __BNT   Raw = 59 %ile = 98  __Complex Ideational Material   Raw = 12  T-Score = 59    __Trail Making Test   A =     48         Errors = 0    %ile = 36-43    B =   125         Errors = 0    %ile = 57-71  __Stroop                       Raw         %ile        Word = _83_      __29-36__        Color =  _58_      __79__        C/W   =  _22_     __ 21-29__     __JoLO   Raw  = 8  %ile = <7    __Grooved Pegboard   RH = 136          Tscore = 43     Drops = 0   LH =  163         TScore = 42    Drops = 0    __BDI-II   Raw__4__ Interp.__Minimal___   __BAI     Raw__0__ Interp. __Minimal___      [place] Scales of Personality Change    __WMS-III   LM1 = 33 SS = 12   LM2 = 11  SS = 9   LM2R = 22 Zscore = 0.16

## 2018-10-25 ENCOUNTER — TELEPHONE (OUTPATIENT)
Dept: NEUROPSYCHOLOGY | Facility: CLINIC | Age: 78
End: 2018-10-25

## 2018-10-25 NOTE — TELEPHONE ENCOUNTER
I spoke with Mr. Ortiz and his wife regarding the results of his neuropsychology exam. All questions were answered to their satisfaction.    Jonny Sloan, Ph.D., L.P., ABPP-CN    Department of Rehabilitation Medicine  Division of Adult Neuropsychology  [facility]

## 2018-11-07 ENCOUNTER — RECORDS - HEALTHEAST (OUTPATIENT)
Dept: ADMINISTRATIVE | Facility: OTHER | Age: 78
End: 2018-11-07

## 2018-12-05 ENCOUNTER — AMBULATORY - HEALTHEAST (OUTPATIENT)
Dept: PULMONOLOGY | Facility: OTHER | Age: 78
End: 2018-12-05

## 2018-12-05 ENCOUNTER — RECORDS - HEALTHEAST (OUTPATIENT)
Dept: ADMINISTRATIVE | Facility: OTHER | Age: 78
End: 2018-12-05

## 2018-12-05 ENCOUNTER — COMMUNICATION - HEALTHEAST (OUTPATIENT)
Dept: ADMINISTRATIVE | Facility: CLINIC | Age: 78
End: 2018-12-05

## 2018-12-05 DIAGNOSIS — J44.9 COPD (CHRONIC OBSTRUCTIVE PULMONARY DISEASE) (H): ICD-10-CM

## 2018-12-06 ENCOUNTER — OFFICE VISIT (OUTPATIENT)
Dept: NEUROLOGY | Facility: CLINIC | Age: 78
End: 2018-12-06
Payer: COMMERCIAL

## 2018-12-06 VITALS
SYSTOLIC BLOOD PRESSURE: 164 MMHG | DIASTOLIC BLOOD PRESSURE: 69 MMHG | OXYGEN SATURATION: 98 % | WEIGHT: 154 LBS | BODY MASS INDEX: 23.34 KG/M2 | HEIGHT: 68 IN | TEMPERATURE: 98.2 F | RESPIRATION RATE: 18 BRPM

## 2018-12-06 DIAGNOSIS — G93.40 ENCEPHALOPATHY: Primary | ICD-10-CM

## 2018-12-06 PROBLEM — Z99.2 ESRD ON PERITONEAL DIALYSIS (H): Status: ACTIVE | Noted: 2018-01-27

## 2018-12-06 PROBLEM — Z79.01 LONG TERM CURRENT USE OF ANTICOAGULANT THERAPY: Status: ACTIVE | Noted: 2018-12-06

## 2018-12-06 PROBLEM — K21.9 GASTRO-ESOPHAGEAL REFLUX DISEASE WITHOUT ESOPHAGITIS: Status: ACTIVE | Noted: 2018-12-06

## 2018-12-06 PROBLEM — D18.01 HEMANGIOMA OF SKIN AND SUBCUTANEOUS TISSUE: Status: ACTIVE | Noted: 2018-12-06

## 2018-12-06 PROBLEM — J44.1 COPD WITH ACUTE EXACERBATION (H): Status: ACTIVE | Noted: 2018-12-06

## 2018-12-06 PROBLEM — J96.01 ACUTE RESPIRATORY FAILURE WITH HYPOXEMIA (H): Status: ACTIVE | Noted: 2018-12-06

## 2018-12-06 PROBLEM — D48.5 NEOPLASM OF UNCERTAIN BEHAVIOR OF SKIN: Status: ACTIVE | Noted: 2018-12-06

## 2018-12-06 PROBLEM — J20.9 ACUTE BRONCHITIS: Status: ACTIVE | Noted: 2017-03-24

## 2018-12-06 PROBLEM — Z72.0 TOBACCO USER: Status: ACTIVE | Noted: 2018-12-06

## 2018-12-06 PROBLEM — N18.6 END STAGE RENAL DISEASE (H): Status: ACTIVE | Noted: 2018-12-06

## 2018-12-06 PROBLEM — J10.1 INFLUENZA A: Status: ACTIVE | Noted: 2018-01-27

## 2018-12-06 PROBLEM — N18.6 ESRD ON PERITONEAL DIALYSIS (H): Status: ACTIVE | Noted: 2018-01-27

## 2018-12-06 PROBLEM — R05.9 COUGH: Status: ACTIVE | Noted: 2018-12-06

## 2018-12-06 PROBLEM — E78.2 MIXED HYPERLIPIDEMIA: Status: ACTIVE | Noted: 2018-12-06

## 2018-12-06 PROBLEM — J21.9 ACUTE BRONCHIOLITIS: Status: ACTIVE | Noted: 2017-03-24

## 2018-12-06 PROBLEM — E87.6 HYPOKALEMIA: Status: ACTIVE | Noted: 2018-01-27

## 2018-12-06 PROBLEM — I25.10 ATHEROSCLEROSIS OF CORONARY ARTERY WITHOUT ANGINA PECTORIS: Status: ACTIVE | Noted: 2018-12-06

## 2018-12-06 PROBLEM — R04.2 HEMOPTYSIS: Status: ACTIVE | Noted: 2018-12-06

## 2018-12-06 PROBLEM — N32.0 BLADDER OUTLET OBSTRUCTION: Status: ACTIVE | Noted: 2018-12-06

## 2018-12-06 PROBLEM — J18.9 CAP (COMMUNITY ACQUIRED PNEUMONIA): Status: ACTIVE | Noted: 2018-01-12

## 2018-12-06 PROBLEM — J11.1 INFLUENZA: Status: ACTIVE | Noted: 2017-03-24

## 2018-12-06 PROBLEM — I48.0 PAROXYSMAL ATRIAL FIBRILLATION (H): Status: ACTIVE | Noted: 2018-12-06

## 2018-12-06 PROBLEM — G93.41 ENCEPHALOPATHY, METABOLIC: Status: ACTIVE | Noted: 2018-01-14

## 2018-12-06 PROBLEM — I10 BENIGN ESSENTIAL HYPERTENSION: Status: ACTIVE | Noted: 2018-12-06

## 2018-12-06 PROBLEM — Z99.2 DEPENDENCE ON RENAL DIALYSIS (H): Status: ACTIVE | Noted: 2018-12-06

## 2018-12-06 PROBLEM — Z79.01 ANTICOAGULATED: Status: ACTIVE | Noted: 2018-01-21

## 2018-12-06 PROBLEM — L82.1 SEBORRHEIC KERATOSIS: Status: ACTIVE | Noted: 2018-12-06

## 2018-12-06 PROBLEM — J44.1 COPD EXACERBATION (H): Status: ACTIVE | Noted: 2018-01-27

## 2018-12-06 PROBLEM — R91.1 SOLITARY PULMONARY NODULE: Status: ACTIVE | Noted: 2018-12-06

## 2018-12-06 PROBLEM — H90.0 CONDUCTIVE HEARING LOSS, BILATERAL: Status: ACTIVE | Noted: 2018-12-06

## 2018-12-06 PROBLEM — K59.00 CONSTIPATION: Status: ACTIVE | Noted: 2018-12-06

## 2018-12-06 PROBLEM — J44.9 CHRONIC OBSTRUCTIVE PULMONARY DISEASE (H): Status: ACTIVE | Noted: 2018-12-06

## 2018-12-06 PROBLEM — Z71.6 ENCOUNTER FOR SMOKING CESSATION COUNSELING: Status: ACTIVE | Noted: 2018-12-06

## 2018-12-06 ASSESSMENT — PAIN SCALES - GENERAL: PAINLEVEL: NO PAIN (0)

## 2018-12-06 NOTE — NURSING NOTE
Chief Complaint   Patient presents with     RECHECK     ump return patient visit for 2 month follow up        Aleyda Sharma MA

## 2018-12-06 NOTE — LETTER
2018       RE: Erick Ortiz  [address] [place] [address]     Dear Colleague,    Thank you for referring your patient, Erick Ortiz, to the [facility] NEUROLOGY at [facility]. Please see a copy of my visit note below.    Service Date: 2018      Clint Jacobsen MD   [facility]    [address] [place] [address]      RE: Erick Ortiz   MRN: [id number]   : 1940      Dear Dr. Jacobsen:      This is in regard to followup on Erick Ortiz .  The patient returned today with a chief complaint of previous encephalopathy as well as logorrhea.  The patient feels that he is somewhat better taking 200 mg of gabapentin.  He has not gone to a higher dose.  He is sleeping well on the drug.  His wife is uncertain how much benefit he has had but probably has had some.  He has continued to be socially active and they did review a recent dinner they had with their neighbors.  The neighbors have had help from him regarding his skills as an .  The patient has gained about 13 pounds and is eating well and taking the medicine.  He is 5 feet 9 inches tall and now weighs 154 pounds.  He said some of this related to eating candy recently, but he stopped doing that.  He did have psychometric evaluation done here at [facility].  Earlier he had had EEG testing which I reviewed with him from 2018.  This did show mild electrographic encephalopathy with an additional left frontotemporal neuronal dysfunction, which is etiologically nonspecific.  They realize that he cannot have an MRI scan of the brain because of his pacemaker.  This is a demand pacemaker, he always has at least 60 beats per minute then.  The patient's neuropsychologic testing was done by Dr. Sloan on 10/22/2018.  This showed that he demonstrated a pattern of mild weaknesses that raised some question about mild dysfunction of the lateral, frontal and mesiotemporal brain regions.   Dr. Sloan did go on to state the current results were mildly abnormal again.  He felt these findings were compatible with the results of his past EEG, although etiology was unclear.  He did note that there was some variability in aspects of verbal fluency, verbally mediated executive functioning and verbal antegrade memory.  There was also an isolated weakness on measure of visual spatial judgments (which could potentially related to past heavy alcohol use).  His other cognitive abilities were intact and performed in keeping with his above average range cognitive baseline.  The recommendations made by Dr. Sloan included a high resolution brain imaging study which will not be done now.  He has had CT scans and I reviewed those results again with him, the most recent done at [facility].  It was noted that he would benefit from ongoing support and supervision of his daily activities.  He and his wife are inseparable.  He was told to encourage to exercise caution when driving.  I did suggest to the patient and his wife again that he have occupational therapy assessment for safety.  He is willing to undergo that and his wife will go with and they will discuss any limitations that he should undergo.        The patient's examination today showed his initial blood pressure done by a medical assistant with a machine was 164/69.  His respiratory rate was 18.  His pulse was 62.  I retook his blood pressure and hi blood pressure was 142/70 with a pulse of 62.  He did have expiratory slowing on auscultation of his lungs consistent with known chronic obstructive lung disease.  He did tell me he quit smoking now 3 days ago.  He had a regular cardiac rhythm without gallops or murmurs.  I could not auscultate cervical bruits.  He had a trace left palmomental reflex but no other frontal lobe release signs.   The patient had normal strength except for his history of chronic weakness involving the right distal foot in the L5  distribution from his previously known herpes zoster.  He had normal gait.  His Mini Cog today showed that it was normal except he thought it was 12/13.  He was otherwise oriented to time, place and person.  He had no trouble recalling 3 objects after 5 minutes.  The patient did know about recent world affairs and could tell me about the [employer] presidency.  He could tell me the states that touch [place] and he had no trouble drawing a clock correctly to indicate 4:30.      The patient assured me he has had no overnight sedation taking gabapentin.  He and his wife are aware that on higher doses with his known renal failure he could develop some issues with asterixis and possible confusion, which he has not had recently.  He did tell me that he has found that his postherpetic pain involving his right foot has responded to some of the stretching exercises as well as to the gabapentin at bedtime.      The patient unfortunately probably will not be able to have followup here at [facility].  I told him I would be happy to see him on a p.r.n. basis.  He and his wife are aware that he should have followup with a neurologist in [place] if he starts having further issues.  Overall, he seems to be better.      Thank you for allowing me to see this patient.        Sincerely yours,       Harlan Calvert MD      I spent 25 minutes with him today.  Over 50% of the time this involved counseling and coordination of care.

## 2018-12-06 NOTE — MR AVS SNAPSHOT
"              After Visit Summary   12/6/2018    Erick Ortiz    MRN: [id number]           Patient Information     Date Of Birth          1940        Visit Information        Provider Department      12/6/2018 2:00 PM Harlan Calvert MD [facility] Neurology        Today's Diagnoses     Encephalopathy    -  1       Follow-ups after your visit        Additional Services     OCCUPATIONAL THERAPY REFERRAL       If you have not heard from the scheduling office within 2 business days, please call [phone] for all locations, with the exception of [place], please call [phone] and [facility], please call [phone].Eval for safety    Please be aware that coverage of these services is subject to the terms and limitations of your health insurance plan.  Call member services at your health plan with any benefit or coverage questions.                  Follow-up notes from your care team     Return in about 6 months (around 6/6/2019).      Future tests that were ordered for you today     Open Future Orders        Priority Expected Expires Ordered    OCCUPATIONAL THERAPY REFERRAL Routine  12/6/2019 12/6/2018            Who to contact     Please call your clinic at [phone] to:    Ask questions about your health    Make or cancel appointments    Discuss your medicines    Learn about your test results    Speak to your doctor            Additional Information About Your Visit        Care EveryWhere ID     This is your Care EveryWhere ID. This could be used by other organizations to access your [facility] medical records  [id number]        Your Vitals Were     Temperature Respirations Height Pulse Oximetry BMI (Body Mass Index)       98.2  F (36.8  C) (Oral) 18 1.727 m (5' 8\") 98% 23.42 kg/m2        Blood Pressure from Last 3 Encounters:   12/06/18 164/69   10/05/18 164/82   09/05/18 155/77    Weight from Last 3 Encounters:   12/06/18 69.9 kg (154 lb)   10/05/18 64.9 kg (143 lb)   09/05/18 65.1 kg (143 lb 8 " oz)               Primary Care Provider Office Phone # Fax #    Sandro Mcdaniel [phone]       [facility] [address] [facility] [id number]        Equal Access to Services     LEWIS WRIGHT : Hadderrick reyes rubéno Soomaali, waaxda luqadaha, qaybta kaalmada adeegyada, earl mingin hayaatiara loyamitali bermeo jose benson. So [place] [phone].    ATENCIÓN: Si habla español, tiene a simon disposición servicios gratuitos de asistencia lingüística. [facility] [phone].    We comply with applicable federal civil rights laws and [place] laws. We do not discriminate on the basis of race, color, national origin, age, disability, sex, sexual orientation, or gender identity.            Thank you!     Thank you for choosing [facility] NEUROLOGY  for your care. Our goal is always to provide you with excellent care. Hearing back from our patients is one way we can continue to improve our services. Please take a few minutes to complete the written survey that you may receive in the mail after your visit with us. Thank you!             Your Updated Medication List - Protect others around you: Learn how to safely use, store and throw away your medicines at www.disposemymeds.org.          This list is accurate as of 12/6/18 11:59 PM.  Always use your most recent med list.                   Brand Name Dispense Instructions for use Diagnosis    amitriptyline 25 MG tablet    ELAVIL     Take 1 tablet by mouth At Bedtime        amLODIPine 5 MG tablet    NORVASC      Encephalopathy, Personality change, Loss of weight       aspirin 81 MG tablet    ASA     Take 1 tablet by mouth daily        CALCIUM + D PO      Take 1 tablet by mouth daily        CENTRUM SILVER ADULT 50+ PO      Take 1 tablet by mouth daily        cholecalciferol 19477 units (1250 mcg) capsule    VITAMIN D3     Take 1 capsule by mouth every 7 days        * cloNIDine 0.1 MG tablet    CATAPRES     Take 0.1 mg by mouth 2 times daily        *  cloNIDine 0.2 MG tablet    CATAPRES     Take 1 tablet by mouth 2 times daily        Co Q 10 100 MG Caps      Take 1 capsule by mouth daily        gabapentin 100 MG capsule    NEURONTIN    360 capsule    Use 200mg bid    Herpes zoster, Idiopathic progressive polyneuropathy       IRON PO      Take 1 tablet by mouth    Encephalopathy, Personality change, Loss of weight       LASIX 80 MG tablet   Generic drug:  furosemide      Take 1 tablet by mouth daily        lisinopril 20 MG tablet    PRINIVIL/ZESTRIL     2 tab PO every morning, 1 tab PO every evening        metoprolol succinate ER 50 MG 24 hr tablet    TOPROL-XL     Take 1 tablet by mouth daily        nitroGLYcerin 0.4 MG sublingual tablet    NITROSTAT     Place 1 tablet under the tongue every 5 minutes as needed        potassium chloride ER 20 MEQ CR tablet    K-DUR/KLOR-CON M     Take 1 tablet by mouth 2 times daily        pravastatin 80 MG tablet    PRAVACHOL     Take 1 tablet by mouth daily        ranitidine 150 MG tablet    ZANTAC     Take 1 tablet by mouth as needed        * WARFARIN SODIUM PO      Take as directed per INR clinic        * warfarin 2 MG tablet    COUMADIN     Take 4 mg on 1/28, redose on 1/29 based on INR per primary MDAdjust dose based on INR results as directed.    Encephalopathy, Personality change, Loss of weight       * Notice:  This list has 4 medication(s) that are the same as other medications prescribed for you. Read the directions carefully, and ask your doctor or other care provider to review them with you.

## 2018-12-07 ENCOUNTER — TELEPHONE (OUTPATIENT)
Dept: NEUROLOGY | Facility: CLINIC | Age: 78
End: 2018-12-07

## 2018-12-07 NOTE — PROGRESS NOTES
Service Date: 2018      Clint Jacobsen MD   [facility]    [address] [id number]      RE: Erick Ortiz   MRN: [id number]   : 1940      Dear Dr. Jacobsen:      This is in regard to followup on Erick Ortiz .  The patient returned today with a chief complaint of previous encephalopathy as well as logorrhea.  The patient feels that he is somewhat better taking 200 mg of gabapentin.  He has not gone to a higher dose.  He is sleeping well on the drug.  His wife is uncertain how much benefit he has had but probably has had some.  He has continued to be socially active and they did review a recent dinner they had with their neighbors.  The neighbors have had help from him regarding his skills as an .  The patient has gained about 13 pounds and is eating well and taking the medicine.  He is 5 feet 9 inches tall and now weighs 154 pounds.  He said some of this related to eating candy recently, but he stopped doing that.  He did have psychometric evaluation done here at [facility].  Earlier he had had EEG testing which I reviewed with him from 2018.  This did show mild electrographic encephalopathy with an additional left frontotemporal neuronal dysfunction, which is etiologically nonspecific.  They realize that he cannot have an MRI scan of the brain because of his pacemaker.  This is a demand pacemaker, he always has at least 60 beats per minute then.  The patient's neuropsychologic testing was done by Dr. Sloan on 10/22/2018.  This showed that he demonstrated a pattern of mild weaknesses that raised some question about mild dysfunction of the lateral, frontal and mesiotemporal brain regions.  Dr. Sloan did go on to state the current results were mildly abnormal again.  He felt these findings were compatible with the results of his past EEG, although etiology was unclear.  He did note that there was some variability in aspects of verbal fluency, verbally  mediated executive functioning and verbal antegrade memory.  There was also an isolated weakness on measure of visual spatial judgments (which could potentially related to past heavy alcohol use).  His other cognitive abilities were intact and performed in keeping with his above average range cognitive baseline.  The recommendations made by Dr. Sloan included a high resolution brain imaging study which will not be done now.  He has had CT scans and I reviewed those results again with him, the most recent done at [facility].  It was noted that he would benefit from ongoing support and supervision of his daily activities.  He and his wife are inseparable.  He was told to encourage to exercise caution when driving.  I did suggest to the patient and his wife again that he have occupational therapy assessment for safety.  He is willing to undergo that and his wife will go with and they will discuss any limitations that he should undergo.        The patient's examination today showed his initial blood pressure done by a medical assistant with a machine was 164/69.  His respiratory rate was 18.  His pulse was 62.  I retook his blood pressure and hi blood pressure was 142/70 with a pulse of 62.  He did have expiratory slowing on auscultation of his lungs consistent with known chronic obstructive lung disease.  He did tell me he quit smoking now 3 days ago.  He had a regular cardiac rhythm without gallops or murmurs.  I could not auscultate cervical bruits.  He had a trace left palmomental reflex but no other frontal lobe release signs.   The patient had normal strength except for his history of chronic weakness involving the right distal foot in the L5 distribution from his previously known herpes zoster.  He had normal gait.  His Mini Cog today showed that it was normal except he thought it was 12/13.  He was otherwise oriented to time, place and person.  He had no trouble recalling 3 objects after 5 minutes.  The  patient did know about recent world affairs and could tell me about the [employer] presidency.  He could tell me the states that touch [place] and he had no trouble drawing a clock correctly to indicate 4:30.      The patient assured me he has had no overnight sedation taking gabapentin.  He and his wife are aware that on higher doses with his known renal failure he could develop some issues with asterixis and possible confusion, which he has not had recently.  He did tell me that he has found that his postherpetic pain involving his right foot has responded to some of the stretching exercises as well as to the gabapentin at bedtime.      The patient unfortunately probably will not be able to have followup here at [facility] Neurology.  I told him I would be happy to see him on a p.r.n. basis.  He and his wife are aware that he should have followup with a neurologist in [place] if he starts having further issues.  Overall, he seems to be better.      Thank you for allowing me to see this patient.        Sincerely yours,       Argenis Calvert MD      I spent 25 minutes with him today.  Over 50% of the time this involved counseling and coordination of care.         ARGENIS CALVERT MD             D: 2018   T: 2018   MT: AKA      Name:     KEISHA DUGAN   MRN:      [id number]        Account:      [id number]   :      1940           Service Date: 2018      Document: [id number]

## 2018-12-07 NOTE — TELEPHONE ENCOUNTER
[facility] Call Center    Phone Message    May a detailed message be left on voicemail: yes    Reason for Call: Other: Pt's wife calling to see if pt needs a stimulator test for driving as well, please call to discuss     Action Taken: Message routed to:  [facility] Neurology Clinic

## 2018-12-08 NOTE — TELEPHONE ENCOUNTER
Let patient know that the only thing needed was Occupational Therapy for him and they will call him to schedule the patient.

## 2018-12-14 ENCOUNTER — HOSPITAL ENCOUNTER (OUTPATIENT)
Dept: OCCUPATIONAL THERAPY | Facility: CLINIC | Age: 78
Setting detail: THERAPIES SERIES
End: 2018-12-14
Attending: PSYCHIATRY & NEUROLOGY
Payer: MEDICARE

## 2018-12-14 DIAGNOSIS — G93.40 ENCEPHALOPATHY: ICD-10-CM

## 2018-12-14 PROCEDURE — G9169 MEMORY GOAL STATUS: HCPCS | Mod: GO,CI | Performed by: OCCUPATIONAL THERAPIST

## 2018-12-14 PROCEDURE — 97166 OT EVAL MOD COMPLEX 45 MIN: CPT | Mod: GO | Performed by: OCCUPATIONAL THERAPIST

## 2018-12-14 PROCEDURE — 40000125 ZZHC STATISTIC OT OUTPT VISIT: Performed by: OCCUPATIONAL THERAPIST

## 2018-12-14 PROCEDURE — G9170 MEMORY D/C STATUS: HCPCS | Mod: GO,CI | Performed by: OCCUPATIONAL THERAPIST

## 2018-12-14 PROCEDURE — 97535 SELF CARE MNGMENT TRAINING: CPT | Mod: GO | Performed by: OCCUPATIONAL THERAPIST

## 2018-12-14 PROCEDURE — G9168 MEMORY CURRENT STATUS: HCPCS | Mod: GO,CI | Performed by: OCCUPATIONAL THERAPIST

## 2018-12-19 NOTE — PROGRESS NOTES
[facility]          OUTPATIENT OCCUPATIONAL THERAPY  EVALUATION  PLAN OF TREATMENT FOR OUTPATIENT REHABILITATION  (COMPLETE FOR INITIAL CLAIMS ONLY)  Patient's Last Name, First Name, M.I.  YOB: 1940  Erick Ortiz                           Provider's Name  [facility] Medical Record No.  [id number]                               Onset Date:     12/06/18   Start of Care Date:     12/14/18   Type:     ___PT   _X_OT   ___SLP Medical Diagnosis:     Encephalopathy                          OT Diagnosis:     impaired IADL Visits from SOC:  1   _________________________________________________________________________________  Plan of Treatment/Functional Goals:  IADL training, ADL training, Self care/Home management, Community/work reintegration     Goals     Goal Description: Pt and his wife will verbalize understanding of importance of monitoring for changes in cognition that could affect driving safety and verbalize understanding of behind the wheel assessment resources and importance of an eye exam prior to resumption of driving.  Target Date: 12/14/18  Date Met: 12/14/18      Therapy Frequency: 1 time     Predicted Duration of Therapy Intervention (days/wks): 1 time only  Ty Orr, OTR/L, MSCS          I CERTIFY THE NEED FOR THESE SERVICES FURNISHED UNDER        THIS PLAN OF TREATMENT AND WHILE UNDER MY CARE     (Physician co-signature of this document indicates review and certification of the therapy plan).                Referring Physician: Harlan Calvert     Initial Assessment        See Epic Evaluation      Start Of Care Date: 12/14/18

## 2018-12-19 NOTE — PROGRESS NOTES
12/14/18 1000   Quick Adds   Type of Visit Initial Outpatient Occupational Therapy Evaluation   General Information   Start Of Care Date 12/14/18   Referring Physician Harlan Calvert   Orders Evaluate and treat as indicated   Other Orders Cognition Assessment   Orders Date 12/06/18   Medical Diagnosis Encephalopathy   Onset of Illness/Injury or Date of Surgery 12/06/18   Surgical/Medical History Reviewed Yes  (smoker)   Additional Occupational Profile Info/Pertinent History of Current Problem Pt is an 78 y.o. man living with his wife who is retired and with dx of encephalopathy and PMH of CAD with stent placment, ESRD, COPD, and prostrate CA. He was recently evaluated by OT for cognition on 9/26/18 using the Cognitive Performance Test scoring a 5.3/5.6. He underwent recent neuropsychometric testing. He is referred back to OT with orders for cognition assessment and physician notes referencing assessing driving safety.   Comments/Observations Wife denies concerns riding with patient in the car. Notes patient does drive short distances by himself, that he does not drive very often and that he does not drive at night, sounds as if she is primary , but pt does go on short errands himself from time to time. Pt is noted to be behaviorally disinhibited telling inappropriate jokes throughout the session, which wife attempts to ignore.   Role/Living Environment   Current Community Support Family/friend caregiver   Patient role/Employment history Retired   Current Living Environment House  (1 level rambler with basement)   Number of Stairs to Enter Home ramp in the front with rail   Primary Bathroom Set Up/Equipment Shower grab bar;Tub/Shower combo   Prior Level - Transfers Independent   Prior Level - Ambulation Independent   Prior Level - ADLS Independent   Prior Responsibilities - IADL Driving;Finances;Medication management;Shopping   Patient/family Goals Statement none stated, wife is present and aware pt is here  "for driving assessment as had called therapist ahead of appt to inquire about it   Pain   Patient currently in pain No   Fall Risk Screen   Fall screen completed by OT   Have you fallen 2 or more times in the past year? Yes   Have you fallen and had an injury in the past year? Yes  (1 month ago putting xmas lights up on ramp)   Is patient a fall risk? Yes   Fall screen comments No LOB ambulating down lin >80 feet with therapist to therapy clinic   Abuse Screen (yes response referral indicated)   Physical Signs of Abuse Present no   Cognitive Status Examination   Attention Distractible during evaluation  (redirection needed due to inappropriate jokes)   Cognitive Comment Pt notes problem with words, I don't know exactly how to describe it, states \"yes\" to word finding problems. Denies problems with memory but wife notes pt may not remember something he was told an hour later. [place] Cognitive Assessment: 25/30. Pt was noted to leave today and needed reminder to take his jacket and hat that he hung on back of door at start of session   Visual Perception   Visual Acuity Eliezer Text Card reading acuity 20/63-did not have reader's with today.   Visual Field Confrontation testing-reduced L peripheral to approx 70 and R 80   Visual Attention Dynavision visual scanning reaction time: Mode A 49 hits/min  (appears BNL from norms (>60))   Visual Perception Comments wears readers for fine print-non-prescription   Sensation   Sensation Comments denies changes   Posture   Posture Comments WFL   Range of Motion (ROM)   ROM Comments UE AROM: WFL   Hand Strength   Hand Dominance Right   Left Hand  (pounds) 62 pounds   Right Hand  (pounds) 54 pounds   Coordination   Coordination Comments WFL with writing   Functional Mobility   Ambulation Ind   Functional Mobility Comments Foot Tap Test: 10 taps 4.5\" WFL( norm is 6\" or < for 10 taps) as correlates with driving safety   Transfer Skill   Level of [place]: Transfers " independent   Bathing   Level of [place] - Bathing independent   Upper Body Dressing   Level of [place]: Dress Upper Body independent   Upper Body Dressing Comments help with buttons on left cuff at times if shirt is new   Lower Body Dressing   Level of [place]: Dress Lower Body independent   Toileting   Level of [place]: Toilet independent   Grooming   Level of [place]: Grooming independent   Eating/Self-Feeding   Level of [place]: Eating independent   Activity Tolerance   Activity Tolerance fatigues with UE use   Planned Therapy Interventions   Planned Therapy Interventions IADL training;ADL training;Self care/Home management;Community/work reintegration   OT Goal 1   Goal Description Pt and his wife will verbalize understanding of importance of monitoring for changes in cognition that could affect driving safety and verbalize understanding of behind the wheel assessment resources and importance of an eye exam prior to resumption of driving.   Target Date 12/14/18   Clinical Impression   Criteria for Skilled Therapeutic Interventions Met Yes, treatment indicated   OT Diagnosis impaired IADL   Influenced by the following impairments reduced cognition,    Assessment of Occupational Performance 3-5 Performance Deficits   Identified Performance Deficits driving, home management, medication management   Clinical Decision Making (Complexity) Moderate complexity   Therapy Frequency 1 time   Predicted Duration of Therapy Intervention (days/wks) 1 time only   Risks and Benefits of Treatment have been explained. Yes   Patient, Family & other staff in agreement with plan of care Yes   Clinical Impression Comments Pt presented today with cogntive screen score that was higher than when last seen in 9/28/18 in this clinic and formal reassessment usingthe Cognitive Performance Test was not completed again due to potential for learned effect. He scored a 5.3/5.6 on last assessment. He was noted to  have behavioral inhibition today stating many inappropriate jokes, needing redirection but otherwise was cooperative. Overal he did demonstrate some reduced slowing of visual reaction time on visual reaction testing with pt noting he felt his L visual field was harder and [facility] screening this visual field is slightly reduced. He reports not having an eye exam in greater than 5 years. His R foot reaction time was WFL. Pt's wife endorses that she has no concerns riding with pt as a passenger and that pt does not drive very often and avoids night driving. Pt was encouraged to have an eye exam as a priority. He will benefit [facility] reassessment of cognition using the Cognitive Performance Test at yearly intervals or sooner if he has notable changes in cognition that would warrant this and was provided [facility] behind the wheel resource phamplets for 2 local programs that can be used if his wife should note concerns or changes in his driving after he has an eye exam. Pt declined therapy intervention to work any further on visual reaction time and cognitive compensation approaches after his eye exam.   Education Assessment   Barriers To Learning Cognitive;Other  (behavioral)   Preferred Learning Style Listening;Demonstration   Total Evaluation Time   OT [place] Moderate Complexity Minutes (89577) 45

## 2019-01-07 ENCOUNTER — TELEPHONE (OUTPATIENT)
Dept: NEUROLOGY | Facility: CLINIC | Age: 79
End: 2019-01-07

## 2019-01-07 NOTE — TELEPHONE ENCOUNTER
Called and spoke to pt's wife Sherley.  I informed her that Dr. Calvert will not be in the clinic until Wednesday and that I would forward her message to him and call her back with his suggestions.  She verbalized understanding and was appreciative of the call back.

## 2019-01-07 NOTE — TELEPHONE ENCOUNTER
[facility] Call Center    Phone Message    May a detailed message be left on voicemail: yes    Reason for Call: Other: Pt's wife calling to ask Dr. Calvert what the results were from her husbands OT test and if Dr. Calvert wants him to take a physical behind the wheel test as well. Please give pt's wife a call back to discuss.      Action Taken: Message routed to:  [facility] Neurology

## 2019-01-12 ENCOUNTER — TELEPHONE (OUTPATIENT)
Dept: NEUROLOGY | Facility: CLINIC | Age: 79
End: 2019-01-12

## 2019-01-12 NOTE — TELEPHONE ENCOUNTER
----- Message from Harlan Calvert MD sent at 1/11/2019  6:59 PM CST -----  Please tell her ot recommended yearly checks  And formal driving eval if he becomes worse. I would recommend only short drives only in good weather and not to use freewaY  ----- Message -----  From: Anika Madrigal RN  Sent: 1/7/2019   1:07 PM  To: Harlan Calvert MD    Patient's wife would like the OT driving eval results and is wondering if you would want Erick to take a physical behind the wheel test.  Please let me know.    Thank you,  Anika

## 2019-01-18 NOTE — TELEPHONE ENCOUNTER
Talked with patient's wife Sherley on the phone and told her the results that  Dr. Calvert sent me via EPIC message.

## 2019-02-10 ENCOUNTER — COMMUNICATION - HEALTHEAST (OUTPATIENT)
Dept: CARDIOLOGY | Facility: CLINIC | Age: 79
End: 2019-02-10

## 2019-02-10 DIAGNOSIS — I48.20 CHRONIC ATRIAL FIBRILLATION (H): ICD-10-CM

## 2019-05-15 DIAGNOSIS — G60.3 IDIOPATHIC PROGRESSIVE POLYNEUROPATHY: ICD-10-CM

## 2019-05-15 DIAGNOSIS — B02.9 HERPES ZOSTER: ICD-10-CM

## 2019-05-15 RX ORDER — GABAPENTIN 100 MG/1
CAPSULE ORAL
Qty: 360 CAPSULE | Refills: 4 | Status: SHIPPED | OUTPATIENT
Start: 2019-05-15

## 2019-10-01 PROBLEM — A41.9 SEPSIS, UNSPECIFIED ORGANISM (H): Status: ACTIVE | Noted: 2018-01-21

## 2021-05-29 ENCOUNTER — RECORDS - HEALTHEAST (OUTPATIENT)
Dept: ADMINISTRATIVE | Facility: CLINIC | Age: 81
End: 2021-05-29

## 2021-05-30 ENCOUNTER — RECORDS - HEALTHEAST (OUTPATIENT)
Dept: ADMINISTRATIVE | Facility: CLINIC | Age: 81
End: 2021-05-30

## 2021-05-31 ENCOUNTER — RECORDS - HEALTHEAST (OUTPATIENT)
Dept: ADMINISTRATIVE | Facility: CLINIC | Age: 81
End: 2021-05-31

## 2021-05-31 VITALS — BODY MASS INDEX: 22.96 KG/M2 | WEIGHT: 155 LBS | HEIGHT: 69 IN

## 2021-05-31 VITALS — WEIGHT: 146 LBS | BODY MASS INDEX: 21.56 KG/M2

## 2021-06-02 ENCOUNTER — RECORDS - HEALTHEAST (OUTPATIENT)
Dept: ADMINISTRATIVE | Facility: CLINIC | Age: 81
End: 2021-06-02

## 2021-06-09 ENCOUNTER — RECORDS - HEALTHEAST (OUTPATIENT)
Dept: ADMINISTRATIVE | Facility: CLINIC | Age: 81
End: 2021-06-09

## 2021-06-15 NOTE — PROGRESS NOTES
In clinic device check with Dr. Lovell.  Please see link for full device report.  Patient was informed of results and next follow up during today's visit.

## 2021-06-15 NOTE — PROGRESS NOTES
[facility] Heart Care Note  Assessment:  1. Chronic atrial fibrillation; after vigorous attempts to maintain sinus        rhythm, we have chosen a rate control strategy since 2011.   2. Single-chamber [employer] ventricular pacemaker 02/25/2011           satisfactory function: 83 %  ventricular pacing.  Intrinsic QRS is narrow   3. Chronic renal insufficiency with a history of protein losing nephropathy and glomerulonephritis,       peritoneal dialysis.   4. History of prostate cancer treated with cryoablation of prostate.   5. Hypertension, controlled on vigorous medical therapy ;Now concerned about hypotension.   6. History of coronary artery disease with multiple coronary interventions,         no intervention since 2006; no ongoing anginal symptoms.  Chronic anticoagulation with warfarin     Plan:    Pacemaker evaluation today is satisfactory although you are showing more pacing; currently 83%  Reduce metoprolol from 100 mg daily to 50 mg daily to allow more of a natural heartbeat and also because of low blood pressure  Continue current medications  Continue to have pacemaker check through transtelephonic monitoring every 3 months  Anticipate the battery should last another 7 years  Echocardiogram January 13, 2018 showed strong heart function with ejection fraction of 66%; normal greater than 50%  Follow up in 1 year or sooner as need arises        Subjective:    I had the opportunity to see.Erick Ortiz , who is a 77 y.o. male with a known history of atrial fibrillation  He was recently hospitalized because of pneumonia and hemoptysis   He  was also hypoxic  Treated with antibiotics  Increase diuretics to furosemide 80 mg twice daily.   Even though he is on peritoneal dialysis- does urinate quite a bit in the day-a fair volume  Echocardiogram January 13, 2018 showed ejection fraction 66% there was some right ventricular enlargement and decreased right ventricular function  There was also mild to  moderate mitral regurgitation biatrial enlargement  EKG showed atrial fibrillation with mainly ventricular paced rhythm with some intrinsic conduction  His hospital course was complicated by some mild clonus felt to be metabolic encephalopathic Adonis and this has resolved  He continues to smoke and advised to stop smoking  Hemoglobin was 9.2, creatinine equals 3.79    His hospital discharge, 1/16/2018, he seems better.  He is not on oxygen, he continues to cough and bring up a bit of hemoptysis  No fevers  Remains on antibiotics  Fluid level seem satisfactory, no edema  No angina  No awareness of arrhythmias  Currently on metoprolol 100 mg daily  I thought we should reduce metoprolol because he has relatively low blood pressure and also to allow more of but intrinsic narrow QRS pattern and less ventricular pacing to reduce risk of dyssynchrony          Problem List:  Patient Active Problem List   Diagnosis     Hemangioma Of The Skin     Skin Neoplasm Of Uncertain Behavior     Seborrheic Keratosis     AF (atrial fibrillation)     CAD (coronary artery disease)     Rib fracture     Syncope     Rib pain     Pacemaker     Influenza     Acute bronchitis     Acute bronchiolitis     CAP (community acquired pneumonia)     Hemoptysis     Cough     Encephalopathy, metabolic     Medical History:  Past Medical History:   Diagnosis Date     A-fib      COPD (chronic obstructive pulmonary disease)      Kidney failure      Peritoneal dialysis adequacy testing      Surgical History:  Past Surgical History:   Procedure Laterality Date     CORONARY STENT PLACEMENT       Social History:  Social History     Social History     Marital status:      Spouse name: N/A     Number of children: N/A     Years of education: N/A     Occupational History     Not on file.     Social History Main Topics     Smoking status: Current Every Day Smoker     Packs/day: 0.50     Years: 65.00     Types: Cigarettes     Smokeless tobacco: Never Used      "Alcohol use 1.8 oz/week     3 Shots of liquor per week     Drug use: No     Sexual activity: Not on file     Other Topics Concern     Not on file     Social History Narrative       Review of Systems:      General: Weight Loss  Eyes: WNL  Ears/Nose/Throat: Hearing Loss  Lungs: Shortness of Breath  Heart: WNL  Stomach: WNL  Bladder: WNL  Muscle/Joints: WNL  Skin: WNL  Nervous System: WNL  Mental Health: WNL     Blood: WNL        Family History:  Family History   Problem Relation Age of Onset     Heart disease Father          Allergies:  Allergies   Allergen Reactions     Hydrochlorothiazide Other (See Comments)     \"blisters\" per a prior allergy entry, \"(sun sensitivity)\" per the patient's medication/allergy list.     Penicillins Diarrhea     severe     Ampicillin Diarrhea     Reaction: Severe diarrhea.     Ibuprofen      Weed Pollen-Short Ragweed Other (See Comments)     Congestion, sneezing       Medications:  Current Outpatient Prescriptions   Medication Sig Dispense Refill     acetaminophen (TYLENOL) 500 MG tablet Take 1,000 mg by mouth every 6 (six) hours as needed for pain.       amitriptyline (ELAVIL) 25 MG tablet Take 25 mg by mouth bedtime.       amLODIPine (NORVASC) 5 MG tablet Take 5 mg by mouth daily.       aspirin 81 MG EC tablet Take 81 mg by mouth bedtime.        cefdinir (OMNICEF) 300 MG capsule Take 1 capsule (300 mg total) by mouth Daily at 6:00 am for 2 days. 2 capsule 0     furosemide (LASIX) 80 MG tablet Take 160 mg by mouth daily.       gabapentin (NEURONTIN) 100 MG capsule Take 100 mg by mouth every morning.       gabapentin (NEURONTIN) 100 MG capsule Take 200 mg by mouth at bedtime.       lisinopril (PRINIVIL,ZESTRIL) 20 MG tablet Take 40 mg by mouth 2 (two) times a day.        metoprolol succinate (TOPROL-XL) 100 MG 24 hr tablet Take 100 mg by mouth at bedtime. Hold if systolic blood pressure is less than 90 mmHg.       multivitamin with minerals (THERA-M) 9 mg iron-400 mcg Tab tablet Take 1 " "tablet by mouth daily.       nitroglycerin (NITROSTAT) 0.4 MG SL tablet Place 0.4 mg under the tongue every 5 (five) minutes as needed for chest pain.        potassium chloride SA (K-DUR,KLOR-CON) 20 MEQ tablet Take 20 mEq by mouth every other day.       pravastatin (PRAVACHOL) 80 MG tablet Take 80 mg by mouth bedtime.       ranitidine (ZANTAC) 150 MG tablet Take 150 mg by mouth at bedtime.        warfarin (COUMADIN) 5 MG tablet Take 5 mg by mouth daily. Adjust dose based on INR results as directed.       nicotine (NICODERM CQ) 14 mg/24 hr Place 1 patch on the skin daily. 28 patch 0     No current facility-administered medications for this visit.          Surgical site  Pacemaker generator  is well-healed    Device interrogation  Intrinsic rhythm is atrial fibrillation with rates in the 50-60  Ventricular pacing equals 83%  No ventricular tachycardia identified  Lead function satisfactory  Battery good for another 7 years    Objective:   Vital signs:  /56 (Patient Site: Left Arm, Patient Position: Sitting, Cuff Size: Adult Regular)  Pulse 64  Resp 16  Ht 5' 9\" (1.753 m)  Wt 155 lb (70.3 kg)  BMI 22.89 kg/m2      Physical Exam:  Blood pressure 98 sitting, 100 standing    GENERAL APPEARANCE: Alert, cooperative and in no acute distress.  HEENT: No scleral icterus. No Xanthelasma. Oral mucous membranes pink and moist.  NECK: JVP  Flat cm. No Hepatojugular reflux. Thyroid not  Palpable  CHEST: clear to auscultation and percussion  CARDIOVASCULAR: S1, S2 without murmur    Brachial, radial  pulses are intact and symmetric.   No carotid bruits noted.  ABDOMEN: Non tender. BS+. No bruits.  EXTREMITIES: No cyanosis, clubbing or edema.    Lab Results:  LIPIDS:  Lab Results   Component Value Date    CHOL 158 09/12/2010    CHOL 158 09/09/2010     Lab Results   Component Value Date    HDL 30 (L) 09/12/2010    HDL 26 (L) 09/09/2010     Lab Results   Component Value Date    LDLCALC 99 09/12/2010    LDLCALC 101 09/09/2010 "     Lab Results   Component Value Date    TRIG 145 09/12/2010    TRIG 154 (H) 09/09/2010     No components found for: CHOLHDL    BMP:  Lab Results   Component Value Date    CREATININE 3.79 (H) 01/16/2018    BUN 33 (H) 01/16/2018     01/16/2018    K 3.8 01/16/2018     01/16/2018    CO2 26 01/16/2018         This note has been dictated using voice recognition software. Any grammatical or context distortions are unintentional and inherent to the software.  Ryan Pickard MD  [facility]  [phone]

## 2022-03-01 NOTE — PROGRESS NOTES
Service Date: 2018      Clint Jacobsen MD   [facility]    [address] [place]  [address]      RE: Erick Ortiz   MRN: [id number]   : 1940      Dear Dr. Jacobsen:      This is in regard to followup on Erick Ortiz.  The patient returned today with chief complaint of personality change and possible memory loss.      The patient did undergo testing here.  His EEG showed some mild electrographic slowing, more prominent in the left temporal area.  This was done over approximately 3 hours.  He did have a 9 Hz background with some intermixed 4-8 Hz theta activity.  There was nothing to suggest epileptiform activity.  He had normal liver functions.  His total protein was low at 6.2 and his albumin low at 2.9.  He did have an ammonia level of 20.  His B12 level was 540 picograms.  His TSH was 1.06 and he had low findings for T3 and T4.  I suspect this relates possibly to his protein abnormalities and to his nephrotic syndrome.  I did suggest though that he have formal endocrine consultation and that is pending here at [facility].  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  He never had trouble taking it and he was on a dosage of 300 mg, that I had reviewed with Dr. Flor a number of years ago when I needed to treat his postherpetic right leg pain and weakness.  The patient's dog evidently has had a condition and that animal has been placed on gabapentin.  The patient and his wife do not have any fear of this drug and it did not cause any side effects.      The patient's blood pressure today was 151/79 with a pulse of 60 by a medical assistant using a machine.  His blood pressure using a soft cuff by me was 144/62 with a pulse of 60.  He had a regular cardiac rhythm without gallops or murmurs.  He had expiratory slowing on auscultation of his lungs.      I went over  from the Internet with the patient and his wife treatment of pseudobulbar state or pseudobulbar affect.  Unfortunately, there is a number of drugs that have been tried but none really have been shown to have major impact on this disease process.  Citalopram has been touted as a treatment option, but I did suggest he would have to be off his current dose of amitriptyline because of the risk of interaction with QT interval issues.  I also reviewed Seroquel and its issues.  He may be a candidate for that drug, too.  First, he is going to try going back on gabapentin up to 200 mg at bedtime to see if this helps his emotional state and his talkativeness.  He surprisingly could still recall much about our consultation over 5 years ago and the most recent one I had with him.  He was not as verbose today and he was not tangential.  The patient and his wife both agreed that he has had some depression issues this spring and that has improved with the ambience of summertime.      I am going to have followup with the patient in the next 2 months and on a p.r.n. basis.      Thank you for allowing me to see this patient.      Sincerely yours,       Argenis Calvert MD      cc:   Mario Flor MD   [facility] [place] [address]         ARGENIS CALVERT MD             D: 2018   T: 2018   MT: AKA      Name:     KEISHA DUGAN   MRN:      [id number]        Account:      [id number]   :      1940           Service Date: 2018      Document: [id number]     [employer] dose 1 and 2

## 2022-06-24 NOTE — TELEPHONE ENCOUNTER
New thyroid referral   T4 Free 0.69        Quality 130: Documentation Of Current Medications In The Medical Record: Current Medications Documented Quality 226: Preventive Care And Screening: Tobacco Use: Screening And Cessation Intervention: Patient screened for tobacco use and is an ex/non-smoker Detail Level: Detailed Quality 110: Preventive Care And Screening: Influenza Immunization: Influenza Immunization previously received during influenza season Quality 431: Preventive Care And Screening: Unhealthy Alcohol Use - Screening: Patient not identified as an unhealthy alcohol user when screened for unhealthy alcohol use using a systematic screening method